# Patient Record
Sex: MALE | Race: WHITE | NOT HISPANIC OR LATINO | Employment: UNEMPLOYED | URBAN - METROPOLITAN AREA
[De-identification: names, ages, dates, MRNs, and addresses within clinical notes are randomized per-mention and may not be internally consistent; named-entity substitution may affect disease eponyms.]

---

## 2019-03-21 ENCOUNTER — OFFICE VISIT (OUTPATIENT)
Dept: GASTROENTEROLOGY | Facility: CLINIC | Age: 34
End: 2019-03-21
Payer: COMMERCIAL

## 2019-03-21 VITALS
BODY MASS INDEX: 42.95 KG/M2 | SYSTOLIC BLOOD PRESSURE: 138 MMHG | DIASTOLIC BLOOD PRESSURE: 98 MMHG | WEIGHT: 300 LBS | HEIGHT: 70 IN | HEART RATE: 106 BPM

## 2019-03-21 DIAGNOSIS — R10.13 EPIGASTRIC ABDOMINAL PAIN: ICD-10-CM

## 2019-03-21 DIAGNOSIS — E66.01 MORBID OBESITY WITH BMI OF 40.0-44.9, ADULT (HCC): ICD-10-CM

## 2019-03-21 DIAGNOSIS — K21.9 GASTROESOPHAGEAL REFLUX DISEASE, ESOPHAGITIS PRESENCE NOT SPECIFIED: ICD-10-CM

## 2019-03-21 DIAGNOSIS — Z12.11 COLON CANCER SCREENING: ICD-10-CM

## 2019-03-21 DIAGNOSIS — G47.33 OBSTRUCTIVE SLEEP APNEA: ICD-10-CM

## 2019-03-21 DIAGNOSIS — K76.0 FATTY LIVER: Primary | ICD-10-CM

## 2019-03-21 PROBLEM — A04.9 BACTERIAL COLITIS: Status: ACTIVE | Noted: 2019-03-21

## 2019-03-21 PROCEDURE — 99214 OFFICE O/P EST MOD 30 MIN: CPT | Performed by: INTERNAL MEDICINE

## 2019-03-21 RX ORDER — ATORVASTATIN CALCIUM 80 MG/1
TABLET, FILM COATED ORAL
COMMUNITY
Start: 2019-02-25

## 2019-03-21 RX ORDER — ESZOPICLONE 2 MG/1
TABLET, FILM COATED ORAL
COMMUNITY
Start: 2019-03-07

## 2019-03-21 RX ORDER — OMEPRAZOLE 40 MG/1
CAPSULE, DELAYED RELEASE ORAL
COMMUNITY
Start: 2019-03-20 | End: 2019-03-21 | Stop reason: SDUPTHER

## 2019-03-21 RX ORDER — BENZTROPINE MESYLATE 1 MG/1
TABLET ORAL
COMMUNITY
Start: 2019-01-11

## 2019-03-21 RX ORDER — ALPRAZOLAM 1 MG/1
TABLET ORAL
COMMUNITY
Start: 2019-03-07

## 2019-03-21 RX ORDER — OMEPRAZOLE 40 MG/1
40 CAPSULE, DELAYED RELEASE ORAL DAILY
Qty: 90 CAPSULE | Refills: 2 | Status: SHIPPED | OUTPATIENT
Start: 2019-03-21 | End: 2019-04-17 | Stop reason: SDUPTHER

## 2019-03-21 RX ORDER — TRIAMCINOLONE ACETONIDE 5 MG/G
CREAM TOPICAL
COMMUNITY
Start: 2019-01-08

## 2019-03-21 RX ORDER — ACETAMINOPHEN 325 MG/1
TABLET ORAL
COMMUNITY
Start: 2019-01-08

## 2019-03-21 RX ORDER — ARIPIPRAZOLE 300 MG
KIT INTRAMUSCULAR
COMMUNITY
Start: 2019-03-12

## 2019-03-21 RX ORDER — BUSPIRONE HYDROCHLORIDE 30 MG/1
TABLET ORAL
COMMUNITY
Start: 2019-02-06

## 2019-03-21 RX ORDER — TRAZODONE HYDROCHLORIDE 100 MG/1
TABLET ORAL
COMMUNITY
Start: 2019-03-07

## 2019-03-21 RX ORDER — VENLAFAXINE HYDROCHLORIDE 75 MG/1
CAPSULE, EXTENDED RELEASE ORAL
COMMUNITY
Start: 2019-03-07

## 2019-03-21 RX ORDER — DEXTROAMPHETAMINE SACCHARATE, AMPHETAMINE ASPARTATE, DEXTROAMPHETAMINE SULFATE AND AMPHETAMINE SULFATE 7.5; 7.5; 7.5; 7.5 MG/1; MG/1; MG/1; MG/1
TABLET ORAL
COMMUNITY
Start: 2019-03-20

## 2019-03-21 RX ORDER — VENLAFAXINE HYDROCHLORIDE 150 MG/1
CAPSULE, EXTENDED RELEASE ORAL
COMMUNITY
Start: 2019-03-20

## 2019-03-21 NOTE — PATIENT INSTRUCTIONS
Gastroesophageal Reflux Disease   WHAT YOU NEED TO KNOW:   Gastroesophageal reflux occurs when acid and food in the stomach back up into the esophagus  Gastroesophageal reflux disease (GERD) is reflux that occurs more than twice a week for a few weeks  It usually causes heartburn and other symptoms  GERD can cause other health problems over time if it is not treated  DISCHARGE INSTRUCTIONS:   Return to the emergency department if:   · You feel full and cannot burp or vomit  · You have severe chest pain and sudden trouble breathing  · Your bowel movements are black, bloody, or tarry-looking  · Your vomit looks like coffee grounds or has blood in it  Contact your healthcare provider if:   · You vomit large amounts, or you vomit often  · You have trouble breathing after you vomit  · You have trouble swallowing, or pain with swallowing  · You are losing weight without trying  · Your symptoms get worse or do not improve with treatment  · You have questions or concerns about your condition or care  Medicines:   · Medicines  are used to decrease stomach acid  Medicine may also be used to help your lower esophageal sphincter and stomach contract (tighten) more  · Take your medicine as directed  Contact your healthcare provider if you think your medicine is not helping or if you have side effects  Tell him of her if you are allergic to any medicine  Keep a list of the medicines, vitamins, and herbs you take  Include the amounts, and when and why you take them  Bring the list or the pill bottles to follow-up visits  Carry your medicine list with you in case of an emergency  Manage GERD:   · Do not have foods or drinks that may increase heartburn  These include chocolate, peppermint, fried or fatty foods, drinks that contain caffeine, or carbonated drinks (soda)  Other foods include spicy foods, onions, tomatoes, and tomato-based foods   Do not have foods or drinks that can irritate your esophagus, such as citrus fruits, juices, and alcohol  · Do not eat large meals  When you eat a lot of food at one time, your stomach needs more acid to digest it  Eat 6 small meals each day instead of 3 large ones, and eat slowly  Do not eat meals 2 to 3 hours before bedtime  · Elevate the head of your bed  Place 6-inch blocks under the head of your bed frame  You may also use more than one pillow under your head and shoulders while you sleep  · Maintain a healthy weight  If you are overweight, weight loss may help relieve symptoms of GERD  · Do not smoke  Smoking weakens the lower esophageal sphincter and increases the risk of GERD  Ask your healthcare provider for information if you currently smoke and need help to quit  E-cigarettes or smokeless tobacco still contain nicotine  Talk to your healthcare provider before you use these products  · Do not wear clothing that is tight around your waist   Tight clothing can put pressure on your stomach and cause or worsen GERD symptoms  Follow up with your healthcare provider as directed:  Write down your questions so you remember to ask them during your visits  © 2017 [facility] Information is for End User's use only and may not be sold, redistributed or otherwise used for commercial purposes  All illustrations and images included in CareNotes® are the copyrighted property of [employer]  or [employer]  The above information is an  only  It is not intended as medical advice for individual conditions or treatments  Talk to your doctor, nurse or pharmacist before following any medical regimen to see if it is safe and effective for you

## 2019-03-21 NOTE — PROGRESS NOTES
[facility] - Outpatient Follow-up Note  Reece Michael 26 y o  male MRN: [id number]  Encounter: [id number]    ASSESSMENT AND PLAN:      1  Fatty liver  Fatty liver noted on ultrasound  Weight loss recommended  LFTs normal in December 2018  Consider checking HOFF fibrosis score at next blood work  2  Gastroesophageal reflux disease, esophagitis presence not specified  Obesity with reflux worsened by CPAP  Started omeprazole with resolution of symptoms  Patient continues to decline any endoscopic evaluation as he does not wish to be sedated  - EGD when patient willing to rule out Saldivar's  - GERD lifestyle modifications, weight loss  - omeprazole (PriLOSEC) 40 MG capsule; Take 1 capsule (40 mg total) by mouth daily  Dispense: 90 capsule; Refill: 2    3  Epigastric abdominal pain  GERD aggravated by CPAP improved with omeprazole    4  Obstructive sleep apnea  On CPAP    5  Morbid obesity with BMI of 40 0-44 9, adult (HCC)  Weight loss recommended    6  Colon cancer screening  Average risk, can started age 46      Followup Appointment[de-identified]  1 year  ______________________________________________________________________    Chief Complaint   Patient presents with    Follow-up     epigastric pain     HPI:  This is a 33-year-old male here today for follow-up  He was last seen in the office on January 23, 2019 for epigastric abdominal pain  He has a medical history significant for multiple psych issues, obstructive sleep apnea on CPAP, smoker, who was having some discomfort  This epigastric discomfort has been going on for a long time but has gotten worse since starting CPAP  Often with a lot of heartburn and radiating pain to the right upper quadrant, worse with eating  Her an ultrasound was performed on each showing fatty liver, no biliary disease  We had discussed getting an EGD but patient does not wish to be sedated    Restart omeprazole with complete resolution of his symptoms  Still smoking  Trying to lose weight  Recently, he states that he has been having some breathing issues  His primary doctor started him on some medications, although I do not see any respiratory medications on his list   He complains of dyspnea on exertion  Historical Information   Past Medical History:   Diagnosis Date    ADHD     Anxiety     Bilateral knee pain     Fatty liver     Noted on ultrasound, 2/19/19, normal LFTs 2/19/19    Hyperlipidemia     Morbid obesity with BMI of 40 0-44 9, adult (HCC)     Nicotine dependence     ALEJANDRA on CPAP     Psoriasis     Schizoaffective disorder ([address] )     Managed by psychiatry, Dr Rut Hicks at [facility]     History reviewed  No pertinent surgical history  Social History     Substance and Sexual Activity   Alcohol Use Not Currently    Frequency: Never     Social History     Substance and Sexual Activity   Drug Use Never     Social History     Tobacco Use   Smoking Status Current Every Day Smoker   Smokeless Tobacco Never Used     Family History   Problem Relation Age of Onset    Colon cancer Neg Hx     Colon polyps Neg Hx     Inflammatory bowel disease Neg Hx          Current Outpatient Medications:     ABILIFY MAINTENA 300 MG injection    ALPRAZolam (XANAX) 1 mg tablet    amphetamine-dextroamphetamine (ADDERALL) 30 MG tablet    atorvastatin (LIPITOR) 80 mg tablet    benztropine (COGENTIN) 1 mg tablet    busPIRone (BUSPAR) 30 MG tablet    eszopiclone (LUNESTA) 2 mg tablet    omeprazole (PriLOSEC) 40 MG capsule    traZODone (DESYREL) 100 mg tablet    triamcinolone (KENALOG) 0 5 % cream    venlafaxine (EFFEXOR-XR) 150 mg 24 hr capsule    venlafaxine (EFFEXOR-XR) 75 mg 24 hr capsule    NON-ASPIRIN PAIN RELIEF 325 MG tablet  No Known Allergies    Shortness of breath, wheezing  Joint pains  10 Point REVIEW OF SYSTEMS IS OTHERWISE NEGATIVE      PHYSICAL EXAM:    Blood pressure 138/98, pulse (!) 106, height 5' 10" (1 778 m), weight 136 kg (300 lb)  Body mass index is 43 05 kg/m²  General Appearance:  Alert, cooperative, no distress  Facial tics  HEENT:  Normocephalic, atraumatic, anicteric  Neck: Supple, symmetrical, trachea midline  Lungs: Clear to auscultation bilaterally; no rales, rhonchi; respirations unlabored  Expiratory wheezing, mild in the bases  Heart: Regular rate and rhythm; no murmur, rub, or gallop  Abdomen:   Soft, non-tender, non-distended; normal bowel sounds; no masses, no organomegaly  Exam limited by body habitus    Rectal:  Deferred   Extremities:  No cyanosis, clubbing or edema   Skin:  No jaundice, rashes, or lesions   Lymph nodes: No palpable cervical lymphadenopathy     Lab Results:   No results found for: WBC, HGB, HCT, MCV, PLT  No results found for: NA, K, CL, CO2, ANIONGAP, BUN, CREATININE, GLUCOSE, GLUF, CALCIUM, CORRECTEDCA, AST, ALT, ALKPHOS, PROT, BILITOT, EGFR  No results found for: IRON, TIBC, FERRITIN  No results found for: LIPASE    Radiology Results:   No results found

## 2019-03-21 NOTE — LETTER
March 21, 2019     Ronna Scheuermann, MD [place]  [place] [address]    Patient: Garcia Purcell   YOB: 1985   Date of Visit: 3/21/2019       Dear Dr Danica Diaz: Thank you for referring Garcia Purcell to me for evaluation  Below are my notes for this consultation  If you have questions, please do not hesitate to call me  I look forward to following your patient along with you  Sincerely,        Jeffery Patel MD        CC: No Recipients  Jeffery Patel MD  3/21/2019 12:00 PM  Sign at close encounter  [address] [facility] - Outpatient Follow-up Note  Garcia Purcell 26 y o  male MRN: [id number]  Encounter: [id number]    ASSESSMENT AND PLAN:      1  Fatty liver  Fatty liver noted on ultrasound  Weight loss recommended  LFTs normal in December 2018  Consider checking HOFF fibrosis score at next blood work  2  Gastroesophageal reflux disease, esophagitis presence not specified  Obesity with reflux worsened by CPAP  Started omeprazole with resolution of symptoms  Patient continues to decline any endoscopic evaluation as he does not wish to be sedated  - EGD when patient willing to rule out Saldivar's  - GERD lifestyle modifications, weight loss  - omeprazole (PriLOSEC) 40 MG capsule; Take 1 capsule (40 mg total) by mouth daily  Dispense: 90 capsule; Refill: 2    3  Epigastric abdominal pain  GERD aggravated by CPAP improved with omeprazole    4  Obstructive sleep apnea  On CPAP    5  Morbid obesity with BMI of 40 0-44 9, adult (HCC)  Weight loss recommended    6  Colon cancer screening  Average risk, can started age 46      Followup Appointment[de-identified]  1 year  ______________________________________________________________________    Chief Complaint   Patient presents with    Follow-up     epigastric pain     HPI:  This is a 47-year-old male here today for follow-up  He was last seen in the office on January 23, 2019 for epigastric abdominal pain  He has a medical history significant for multiple psych issues, obstructive sleep apnea on CPAP, smoker, who was having some discomfort  This epigastric discomfort has been going on for a long time but has gotten worse since starting CPAP  Often with a lot of heartburn and radiating pain to the right upper quadrant, worse with eating  Her an ultrasound was performed on each showing fatty liver, no biliary disease  We had discussed getting an EGD but patient does not wish to be sedated  Restart omeprazole with complete resolution of his symptoms  Still smoking  Trying to lose weight  Recently, he states that he has been having some breathing issues  His primary doctor started him on some medications, although I do not see any respiratory medications on his list   He complains of dyspnea on exertion  Historical Information   Past Medical History:   Diagnosis Date    ADHD     Anxiety     Bilateral knee pain     Fatty liver     Noted on ultrasound, 2/19/19, normal LFTs 2/19/19    Hyperlipidemia     Morbid obesity with BMI of 40 0-44 9, adult (HCC)     Nicotine dependence     ALEJANDRA on CPAP     Psoriasis     Schizoaffective disorder ([address] )     Managed by psychiatry, Dr Coleen Redd at [facility]     History reviewed  No pertinent surgical history    Social History     Substance and Sexual Activity   Alcohol Use Not Currently    Frequency: Never     Social History     Substance and Sexual Activity   Drug Use Never     Social History     Tobacco Use   Smoking Status Current Every Day Smoker   Smokeless Tobacco Never Used     Family History   Problem Relation Age of Onset    Colon cancer Neg Hx     Colon polyps Neg Hx     Inflammatory bowel disease Neg Hx          Current Outpatient Medications:     ABILIFY MAINTENA 300 MG injection    ALPRAZolam (XANAX) 1 mg tablet    amphetamine-dextroamphetamine (ADDERALL) 30 MG tablet    atorvastatin (LIPITOR) 80 mg tablet    benztropine (COGENTIN) 1 mg tablet    busPIRone (BUSPAR) 30 MG tablet    eszopiclone (LUNESTA) 2 mg tablet    omeprazole (PriLOSEC) 40 MG capsule    traZODone (DESYREL) 100 mg tablet    triamcinolone (KENALOG) 0 5 % cream    venlafaxine (EFFEXOR-XR) 150 mg 24 hr capsule    venlafaxine (EFFEXOR-XR) 75 mg 24 hr capsule    NON-ASPIRIN PAIN RELIEF 325 MG tablet  No Known Allergies    Shortness of breath, wheezing  Joint pains  10 Point REVIEW OF SYSTEMS IS OTHERWISE NEGATIVE  PHYSICAL EXAM:    Blood pressure 138/98, pulse (!) 106, height 5' 10" (1 778 m), weight 136 kg (300 lb)  Body mass index is 43 05 kg/m²  General Appearance:  Alert, cooperative, no distress  Facial tics  HEENT:  Normocephalic, atraumatic, anicteric  Neck: Supple, symmetrical, trachea midline  Lungs: Clear to auscultation bilaterally; no rales, rhonchi; respirations unlabored  Expiratory wheezing, mild in the bases  Heart: Regular rate and rhythm; no murmur, rub, or gallop  Abdomen:   Soft, non-tender, non-distended; normal bowel sounds; no masses, no organomegaly  Exam limited by body habitus    Rectal:  Deferred   Extremities:  No cyanosis, clubbing or edema   Skin:  No jaundice, rashes, or lesions   Lymph nodes: No palpable cervical lymphadenopathy     Lab Results:   No results found for: WBC, HGB, HCT, MCV, PLT  No results found for: NA, K, CL, CO2, ANIONGAP, BUN, CREATININE, GLUCOSE, GLUF, CALCIUM, CORRECTEDCA, AST, ALT, ALKPHOS, PROT, BILITOT, EGFR  No results found for: IRON, TIBC, FERRITIN  No results found for: LIPASE    Radiology Results:   No results found

## 2019-04-17 DIAGNOSIS — K21.9 GASTROESOPHAGEAL REFLUX DISEASE, ESOPHAGITIS PRESENCE NOT SPECIFIED: ICD-10-CM

## 2019-04-22 RX ORDER — OMEPRAZOLE 40 MG/1
CAPSULE, DELAYED RELEASE ORAL
Qty: 30 CAPSULE | Refills: 0 | Status: SHIPPED | OUTPATIENT
Start: 2019-04-22 | End: 2019-12-21 | Stop reason: SDUPTHER

## 2019-12-21 DIAGNOSIS — K21.9 GASTROESOPHAGEAL REFLUX DISEASE, ESOPHAGITIS PRESENCE NOT SPECIFIED: ICD-10-CM

## 2019-12-27 RX ORDER — OMEPRAZOLE 40 MG/1
CAPSULE, DELAYED RELEASE ORAL
Qty: 30 CAPSULE | Refills: 0 | Status: SHIPPED | OUTPATIENT
Start: 2019-12-27 | End: 2020-01-21

## 2020-01-19 DIAGNOSIS — K21.9 GASTROESOPHAGEAL REFLUX DISEASE, ESOPHAGITIS PRESENCE NOT SPECIFIED: ICD-10-CM

## 2020-01-21 RX ORDER — OMEPRAZOLE 40 MG/1
CAPSULE, DELAYED RELEASE ORAL
Qty: 30 CAPSULE | Refills: 0 | Status: SHIPPED | OUTPATIENT
Start: 2020-01-21 | End: 2020-02-13

## 2020-02-13 DIAGNOSIS — K21.9 GASTROESOPHAGEAL REFLUX DISEASE, ESOPHAGITIS PRESENCE NOT SPECIFIED: ICD-10-CM

## 2020-02-13 RX ORDER — OMEPRAZOLE 40 MG/1
CAPSULE, DELAYED RELEASE ORAL
Qty: 30 CAPSULE | Refills: 0 | Status: SHIPPED | OUTPATIENT
Start: 2020-02-13 | End: 2020-03-11 | Stop reason: SDUPTHER

## 2020-03-11 ENCOUNTER — OFFICE VISIT (OUTPATIENT)
Dept: GASTROENTEROLOGY | Facility: CLINIC | Age: 35
End: 2020-03-11
Payer: COMMERCIAL

## 2020-03-11 VITALS
SYSTOLIC BLOOD PRESSURE: 138 MMHG | BODY MASS INDEX: 43.67 KG/M2 | WEIGHT: 305 LBS | HEIGHT: 70 IN | DIASTOLIC BLOOD PRESSURE: 80 MMHG | HEART RATE: 82 BPM

## 2020-03-11 DIAGNOSIS — K21.9 GASTROESOPHAGEAL REFLUX DISEASE, ESOPHAGITIS PRESENCE NOT SPECIFIED: Primary | ICD-10-CM

## 2020-03-11 DIAGNOSIS — G47.33 OBSTRUCTIVE SLEEP APNEA: ICD-10-CM

## 2020-03-11 DIAGNOSIS — K76.0 FATTY LIVER: ICD-10-CM

## 2020-03-11 DIAGNOSIS — E66.01 MORBID OBESITY WITH BMI OF 40.0-44.9, ADULT (HCC): ICD-10-CM

## 2020-03-11 DIAGNOSIS — Z12.11 COLON CANCER SCREENING: ICD-10-CM

## 2020-03-11 PROCEDURE — 99214 OFFICE O/P EST MOD 30 MIN: CPT | Performed by: INTERNAL MEDICINE

## 2020-03-11 RX ORDER — OMEPRAZOLE 40 MG/1
40 CAPSULE, DELAYED RELEASE ORAL DAILY
Qty: 90 CAPSULE | Refills: 3 | Status: SHIPPED | OUTPATIENT
Start: 2020-03-11 | End: 2021-01-28 | Stop reason: SDUPTHER

## 2020-03-11 NOTE — LETTER
March 11, 2020     Rian Meneses, [address]  [place] [address] [facility]    Patient: Sandi Aldrich   YOB: 1985   Date of Visit: 3/11/2020       Dear Dr Bryan Can: Thank you for referring Sandi Aldrich to me for evaluation  Below are my notes for this consultation  If you have questions, please do not hesitate to call me  I look forward to following your patient along with you  Sincerely,        Yefri Munoz MD        CC: No Recipients  Yefri Munoz MD  3/11/2020 10:06 AM  Incomplete  [address] [facility] - Outpatient Follow-up Note  Sandi Aldrich 37 y o  male MRN: [id number]  Encounter: [id number]    ASSESSMENT AND PLAN:      1  Gastroesophageal reflux disease, esophagitis presence not specified  61-year-old male with obesity, sleep apnea on CPAP, here today for follow-up of reflux  Doing well on the omeprazole 40 mg daily  Never had Saldivar's surveillance so we will schedule today as patient is willing     - Schedule EGD @ [facility]  - omeprazole (PriLOSEC) 40 MG capsule; Take 1 capsule (40 mg total) by mouth daily  Dispense: 90 capsule; Refill: 3    2  Fatty liver  Noted on ultrasound  Normal LFTs in 2018  Will recheck with fibrosis score today  - HCV FIBROSURE; Future  - Hepatic function panel; Future  - Hepatitis C antibody; Future  - HCV FIBROSURE  - Hepatic function panel  - Hepatitis C antibody    3  Obstructive sleep apnea  On CPAP    4  Morbid obesity with BMI of 40 0-44 9, adult ([address] )  Started to see a nutritionist   He is here today with his  who states that they are starting an exercise regimen with hopes to lose weight      5  Colon cancer screening  Average risk, can start at age 46      Followup Appointment:  1 year  ______________________________________________________________________    Chief Complaint   Patient presents with    Follow-up    Heartburn    fatty liver     HPI:  61-year-old male here today for follow-up of his reflux and fatty liver  Doing well on omeprazole 40  No complaints  Gaining weight  Started to see a nutritionist and started an exercise regimen  Still eating large portions  Denies any nausea vomiting, abdominal pain, jaundice, diarrhea, changes in bowel habits  Historical Information   Past Medical History:   Diagnosis Date    ADHD     Anxiety     Bacterial colitis 01/2019    in ER per GI notes    Bilateral knee pain     Depression     Fatty liver     Noted on ultrasound, 2/19/19, normal LFTs 2/19/19    Hyperlipidemia     Morbid obesity with BMI of 40 0-44 9, adult (HCC)     Nicotine dependence     ALEJANDRA on CPAP     Psoriasis     Schizoaffective disorder ([address] )     Managed by psychiatry, Dr Jose Cruz Sarmiento at [facility]     No past surgical history on file    Social History     Substance and Sexual Activity   Alcohol Use Not Currently    Frequency: Never     Social History     Substance and Sexual Activity   Drug Use Never     Social History     Tobacco Use   Smoking Status Current Every Day Smoker    Types: Cigarettes   Smokeless Tobacco Never Used     Family History   Problem Relation Age of Onset    No Known Problems Mother     No Known Problems Father     No Known Problems Brother     No Known Problems Brother     Colon cancer Neg Hx     Colon polyps Neg Hx     Inflammatory bowel disease Neg Hx          Current Outpatient Medications:     ABILIFY MAINTENA 300 MG injection    ALPRAZolam (XANAX) 1 mg tablet    amphetamine-dextroamphetamine (ADDERALL) 30 MG tablet    atorvastatin (LIPITOR) 80 mg tablet    benztropine (COGENTIN) 1 mg tablet    busPIRone (BUSPAR) 30 MG tablet    eszopiclone (LUNESTA) 2 mg tablet    NON-ASPIRIN PAIN RELIEF 325 MG tablet    omeprazole (PriLOSEC) 40 MG capsule    traZODone (DESYREL) 100 mg tablet    triamcinolone (KENALOG) 0 5 % cream    venlafaxine (EFFEXOR-XR) 150 mg 24 hr capsule    venlafaxine (EFFEXOR-XR) 75 mg 24 hr capsule  No Known Allergies  Reviewed medications and allergies and updated as indicated    PHYSICAL EXAM:    Blood pressure 138/80, pulse 82, height 5' 10" (1 778 m), weight (!) 138 kg (305 lb)  Body mass index is 43 76 kg/m²  General Appearance: NAD, cooperative, alert  Eyes: Anicteric, PERRLA, EOMI  ENT:  Normocephalic, atraumatic, normal mucosa  Neck:  Supple, symmetrical, trachea midline  Resp:  Clear to auscultation bilaterally; no rales, rhonchi or wheezing; respirations unlabored   CV:  S1 S2, Regular rate and rhythm; no murmur, rub, or gallop  GI:  Soft, non-tender, non-distended; normal bowel sounds; no masses, no organomegaly   although exam limited by body habitus  Rectal: Deferred  Musculoskeletal: No cyanosis, clubbing or edema  Normal ROM  Skin:  No jaundice, rashes, or lesions   Heme/Lymph: No palpable cervical lymphadenopathy  Psych: Normal affect, good eye contact  Neuro: No gross deficits, AAOx3    Lab Results:   No results found for: WBC, HGB, HCT, MCV, PLT  No results found for: NA, K, CL, CO2, ANIONGAP, BUN, CREATININE, GLUCOSE, GLUF, CALCIUM, CORRECTEDCA, AST, ALT, ALKPHOS, PROT, BILITOT, EGFR  No results found for: IRON, TIBC, FERRITIN  No results found for: LIPASE    Radiology Results:   No results found

## 2020-03-11 NOTE — PROGRESS NOTES
[address] [facility] - Outpatient Follow-up Note  Judd Pruett 37 y o  male MRN: [id number]  Encounter: [id number]    ASSESSMENT AND PLAN:      1  Gastroesophageal reflux disease, esophagitis presence not specified  25-year-old male with obesity, sleep apnea on CPAP, here today for follow-up of reflux  Doing well on the omeprazole 40 mg daily  Never had Saldivar's surveillance so we will schedule today as patient is willing     - Schedule EGD @ [facility]  - omeprazole (PriLOSEC) 40 MG capsule; Take 1 capsule (40 mg total) by mouth daily  Dispense: 90 capsule; Refill: 3    2  Fatty liver  Noted on ultrasound  Normal LFTs in 2018  Will recheck with fibrosis score today  - HCV FIBROSURE; Future  - Hepatic function panel; Future  - Hepatitis C antibody; Future  - HCV FIBROSURE  - Hepatic function panel  - Hepatitis C antibody    3  Obstructive sleep apnea  On CPAP    4  Morbid obesity with BMI of 40 0-44 9, adult ([address] )  Started to see a nutritionist   He is here today with his  who states that they are starting an exercise regimen with hopes to lose weight  5  Colon cancer screening  Average risk, can start at age 46      Followup Appointment:  1 year  ______________________________________________________________________    Chief Complaint   Patient presents with    Follow-up    Heartburn    fatty liver     HPI:  25-year-old male here today for follow-up of his reflux and fatty liver  Doing well on omeprazole 40  No complaints  Gaining weight  Started to see a nutritionist and started an exercise regimen  Still eating large portions  Denies any nausea vomiting, abdominal pain, jaundice, diarrhea, changes in bowel habits      Historical Information   Past Medical History:   Diagnosis Date    ADHD     Anxiety     Bacterial colitis 01/2019    in ER per GI notes    Bilateral knee pain     Depression     Fatty liver     Noted on ultrasound, 2/19/19, normal LFTs 2/19/19    Hyperlipidemia     Morbid obesity with BMI of 40 0-44 9, adult (HCC)     Nicotine dependence     ALEJANDRA on CPAP     Psoriasis     Schizoaffective disorder ([address] )     Managed by psychiatry, Dr Stephany Rodriguez at [facility]     No past surgical history on file  Social History     Substance and Sexual Activity   Alcohol Use Not Currently    Frequency: Never     Social History     Substance and Sexual Activity   Drug Use Never     Social History     Tobacco Use   Smoking Status Current Every Day Smoker    Types: Cigarettes   Smokeless Tobacco Never Used     Family History   Problem Relation Age of Onset    No Known Problems Mother     No Known Problems Father     No Known Problems Brother     No Known Problems Brother     Colon cancer Neg Hx     Colon polyps Neg Hx     Inflammatory bowel disease Neg Hx          Current Outpatient Medications:     ABILIFY MAINTENA 300 MG injection    ALPRAZolam (XANAX) 1 mg tablet    amphetamine-dextroamphetamine (ADDERALL) 30 MG tablet    atorvastatin (LIPITOR) 80 mg tablet    benztropine (COGENTIN) 1 mg tablet    busPIRone (BUSPAR) 30 MG tablet    eszopiclone (LUNESTA) 2 mg tablet    NON-ASPIRIN PAIN RELIEF 325 MG tablet    omeprazole (PriLOSEC) 40 MG capsule    traZODone (DESYREL) 100 mg tablet    triamcinolone (KENALOG) 0 5 % cream    venlafaxine (EFFEXOR-XR) 150 mg 24 hr capsule    venlafaxine (EFFEXOR-XR) 75 mg 24 hr capsule  No Known Allergies  Reviewed medications and allergies and updated as indicated    PHYSICAL EXAM:    Blood pressure 138/80, pulse 82, height 5' 10" (1 778 m), weight (!) 138 kg (305 lb)  Body mass index is 43 76 kg/m²  General Appearance: NAD, cooperative, alert  Eyes: Anicteric, PERRLA, EOMI  ENT:  Normocephalic, atraumatic, normal mucosa      Neck:  Supple, symmetrical, trachea midline  Resp:  Clear to auscultation bilaterally; no rales, rhonchi or wheezing; respirations unlabored   CV:  S1 S2, Regular rate and rhythm; no murmur, rub, or gallop  GI:  Soft, non-tender, non-distended; normal bowel sounds; no masses, no organomegaly  although exam limited by body habitus  Rectal: Deferred  Musculoskeletal: No cyanosis, clubbing or edema  Normal ROM  Skin:  No jaundice, rashes, or lesions   Heme/Lymph: No palpable cervical lymphadenopathy  Psych: Normal affect, good eye contact  Neuro: No gross deficits, AAOx3    Lab Results:   No results found for: WBC, HGB, HCT, MCV, PLT  No results found for: NA, K, CL, CO2, ANIONGAP, BUN, CREATININE, GLUCOSE, GLUF, CALCIUM, CORRECTEDCA, AST, ALT, ALKPHOS, PROT, BILITOT, EGFR  No results found for: IRON, TIBC, FERRITIN  No results found for: LIPASE    Radiology Results:   No results found

## 2020-03-13 LAB
A2 MACROGLOB SERPL-MCNC: 116 MG/DL (ref 110–276)
ALBUMIN SERPL-MCNC: 4.6 G/DL (ref 4–5)
ALP SERPL-CCNC: 62 IU/L (ref 39–117)
ALT SERPL W P-5'-P-CCNC: 35 IU/L (ref 0–55)
ALT SERPL-CCNC: 30 IU/L (ref 0–44)
APO A-I SERPL-MCNC: 82 MG/DL (ref 101–178)
AST SERPL-CCNC: 28 IU/L (ref 0–40)
BILIRUB DIRECT SERPL-MCNC: 0.08 MG/DL (ref 0–0.4)
BILIRUB SERPL-MCNC: 0.1 MG/DL (ref 0–1.2)
BILIRUB SERPL-MCNC: <0.2 MG/DL (ref 0–1.2)
COMMENT: ABNORMAL
FIBROSIS SCORING:: ABNORMAL
FIBROSIS STAGE SERPL QL: ABNORMAL
GGT SERPL-CCNC: 58 IU/L (ref 0–65)
HAPTOGLOB SERPL-MCNC: 129 MG/DL (ref 17–317)
HCV AB S/CO SERPL IA: <0.1 S/CO RATIO (ref 0–0.9)
INTERPRETATIONS: ABNORMAL
LIVER FIBR SCORE SERPL CALC.FIBROSURE: 0.06 (ref 0–0.21)
NECROINFLAMM ACTIVITY SCORING:: ABNORMAL
NECROINFLAMMATORY ACT GRADE SERPL QL: ABNORMAL
NECROINFLAMMATORY ACT SCORE SERPL: 0.13 (ref 0–0.17)
PROT SERPL-MCNC: 7.2 G/DL (ref 6–8.5)
SERVICE CMNT-IMP: ABNORMAL

## 2020-05-08 RX ORDER — SODIUM CHLORIDE 9 MG/ML
75 INJECTION, SOLUTION INTRAVENOUS CONTINUOUS
Status: CANCELLED | OUTPATIENT
Start: 2020-05-08

## 2020-05-18 ENCOUNTER — TELEPHONE (OUTPATIENT)
Dept: GASTROENTEROLOGY | Facility: CLINIC | Age: 35
End: 2020-05-18

## 2020-05-20 ENCOUNTER — HOSPITAL ENCOUNTER (OUTPATIENT)
Dept: GASTROENTEROLOGY | Facility: HOSPITAL | Age: 35
Setting detail: OUTPATIENT SURGERY
Discharge: HOME | End: 2020-05-20
Attending: INTERNAL MEDICINE

## 2020-10-15 ENCOUNTER — TELEPHONE (OUTPATIENT)
Dept: GASTROENTEROLOGY | Facility: CLINIC | Age: 35
End: 2020-10-15

## 2021-01-25 DIAGNOSIS — K21.9 GASTROESOPHAGEAL REFLUX DISEASE: ICD-10-CM

## 2021-01-25 RX ORDER — OMEPRAZOLE 40 MG/1
40 CAPSULE, DELAYED RELEASE ORAL DAILY
Qty: 28 CAPSULE | Refills: 1 | Status: CANCELLED | OUTPATIENT
Start: 2021-01-25 | End: 2021-02-22

## 2021-01-28 DIAGNOSIS — K21.9 GASTROESOPHAGEAL REFLUX DISEASE: ICD-10-CM

## 2021-01-28 RX ORDER — OMEPRAZOLE 40 MG/1
40 CAPSULE, DELAYED RELEASE ORAL DAILY
Qty: 90 CAPSULE | Refills: 1 | Status: SHIPPED | OUTPATIENT
Start: 2021-01-28

## 2021-02-03 NOTE — TELEPHONE ENCOUNTER
1st call-lvm for pt to resched cancelled egd per dr shin-if pt does not want to sched the egd sched ov

## 2021-02-24 NOTE — TELEPHONE ENCOUNTER
Per dr flores if pt did not reschedule cancelled egd which he did not-she wants pt to be contacted to schedule ov

## 2023-04-26 ENCOUNTER — TELEPHONE (OUTPATIENT)
Dept: OTHER | Facility: OTHER | Age: 38
End: 2023-04-26

## 2023-04-26 NOTE — TELEPHONE ENCOUNTER
Called pt's mom Dominick Travis back, pt was seen by Darion 4/24 and was recommended he have an apt here due to weight loss, reflux and lung infiltrate  Scheduled an apt 5/3  [facility] records obtained and scanned to [address]  Called Darion and spoke to Zulema, she will fax his most recent labs and ov from March  His 4/24 ov note is not yet dictated  Note written for ov note as a reminder

## 2023-04-26 NOTE — TELEPHONE ENCOUNTER
Pt's mother called in stating pt is with [facility] and the doctor said he would call Dr Sarita Liu to see about getting pt in for an endoscopy  She would like to know if this was done  She is requesting a call back at [phone]

## 2023-05-03 ENCOUNTER — TELEPHONE (OUTPATIENT)
Dept: GASTROENTEROLOGY | Facility: CLINIC | Age: 38
End: 2023-05-03

## 2023-05-03 ENCOUNTER — CONSULT (OUTPATIENT)
Dept: GASTROENTEROLOGY | Facility: CLINIC | Age: 38
End: 2023-05-03

## 2023-05-03 VITALS
DIASTOLIC BLOOD PRESSURE: 80 MMHG | WEIGHT: 253 LBS | BODY MASS INDEX: 36.22 KG/M2 | HEIGHT: 70 IN | SYSTOLIC BLOOD PRESSURE: 102 MMHG

## 2023-05-03 DIAGNOSIS — R63.4 WEIGHT LOSS, ABNORMAL: ICD-10-CM

## 2023-05-03 DIAGNOSIS — K21.9 GASTROESOPHAGEAL REFLUX DISEASE, UNSPECIFIED WHETHER ESOPHAGITIS PRESENT: Primary | ICD-10-CM

## 2023-05-03 DIAGNOSIS — R93.89 ABNORMAL CT OF THE CHEST: ICD-10-CM

## 2023-05-03 RX ORDER — POLYETHYLENE GLYCOL 3350, SODIUM SULFATE ANHYDROUS, SODIUM BICARBONATE, SODIUM CHLORIDE, POTASSIUM CHLORIDE 236; 22.74; 6.74; 5.86; 2.97 G/4L; G/4L; G/4L; G/4L; G/4L
4000 POWDER, FOR SOLUTION ORAL ONCE
Qty: 4000 ML | Refills: 0 | Status: SHIPPED | OUTPATIENT
Start: 2023-05-03 | End: 2023-05-03

## 2023-05-03 RX ORDER — NALTREXONE HYDROCHLORIDE 50 MG/1
380 TABLET, FILM COATED ORAL DAILY
COMMUNITY

## 2023-05-03 RX ORDER — ZOLPIDEM TARTRATE 10 MG/1
TABLET ORAL
COMMUNITY
Start: 2023-04-26

## 2023-05-03 RX ORDER — ALBUTEROL SULFATE
POWDER (GRAM) MISCELLANEOUS
COMMUNITY

## 2023-05-03 RX ORDER — ESCITALOPRAM OXALATE 5 MG/1
TABLET ORAL
COMMUNITY
Start: 2023-04-26

## 2023-05-03 RX ORDER — POLYETHYLENE GLYCOL 3350 17 G/17G
17 POWDER, FOR SOLUTION ORAL DAILY
Qty: 238 G | Refills: 0 | Status: SHIPPED | OUTPATIENT
Start: 2023-05-03

## 2023-05-03 RX ORDER — NALOXONE HYDROCHLORIDE 4 MG/.1ML
SPRAY NASAL
COMMUNITY
Start: 2023-03-27

## 2023-05-03 RX ORDER — CLONIDINE HYDROCHLORIDE 0.1 MG/1
TABLET ORAL
COMMUNITY
Start: 2023-04-26

## 2023-05-03 RX ORDER — QUETIAPINE FUMARATE 50 MG/1
TABLET, FILM COATED ORAL
COMMUNITY
Start: 2023-04-26

## 2023-05-03 NOTE — TELEPHONE ENCOUNTER
Procedure rescheduled-requested convenience care    Scheduled date of EGD/colonoscopy (as of today):05/30/2023  Physician performing EGD/colonoscopy: Dr Ally Sosa  Location of EGD/colonoscopy: [facility]    Waiting for approval from Dr Florin Xie for convenience care

## 2023-05-03 NOTE — TELEPHONE ENCOUNTER
Scheduled date of EGD/colonoscopy (as of today): 5/11/23  Physician performing EGD/colonoscopy: Dr Ofelia Vogel  Location of EGD/colonoscopy: [facility] ([facility])  Desired bowel prep reviewed with patient: golytely  Instructions reviewed with patient by: gave patient packet  Clearances:  No

## 2023-05-03 NOTE — TELEPHONE ENCOUNTER
Dr Leandro Landon pt's mom Mamie Rosales back, after reading the prep instructions she is very concerned he will need a great deal of assistance  [facility] offers convenience care for the pt to be admitted late afternoon the evening prior to his procedures for assistance with the prep  OK to set that up for the pt at [facility]  Harini--pt's mom would like to proceed with scheduling at [facility] if ok with Dr Marisabel Mercer  She said you were working on it

## 2023-05-03 NOTE — PROGRESS NOTES
[facility] - Outpatient Consultation  Jaydon Garay 34 y o  male MRN: [id number]  Encounter: [id number]    ASSESSMENT AND PLAN:      1  Gastroesophageal reflux disease, unspecified whether esophagitis present  Symptoms are well controlled on omeprazole 20 mg daily, however the severe weight loss is quite concerning, and I will proceed with upper endoscopy    2  Weight loss, abnormal  To be complete, I also will proceed with colonoscopy, he did have a diagnosis of bacterial colitis a few years ago  While he is at average risk for colon cancer, the weight loss is certainly an indication for the procedure  Procedure risks and preparation discussed in detail with the patient and his parents  3  Abnormal CT of the chest  There are no gross findings to suggest an upper GI tract neoplasm, however the right upper lobe infiltrate could represent aspiration from reflux  We will proceed as above      Follow up Appointment: For EGD and colonoscopy    Chief Complaint   Patient presents with    Weight Loss     Consult for EGD       HPI:   Jaydon Garay is a 34y o  year old male with a PMH significant for GERD and bacterial colitis who presents from a consultation from oncology for extreme weight loss  In the year, he has lost 76 pounds with no clear etiology  He states (and his parents confirmed) that his diet has not changed  He has no change in bowel habits, but he does have chronic GERD well-controlled on omeprazole 20 mg  He denies dysphagia and odynophagia  He is a past smoker  Due to his schizophrenia and mental illness, the review of systems and history was collaborated by the parents in the room      Historical Information   Past Medical History:   Diagnosis Date    ADHD     Anxiety     Bacterial colitis 01/2019    in ER per GI notes    Bilateral knee pain     Depression     Fatty liver     Noted on ultrasound, 2/19/19, normal LFTs 2/19/19    GERD "(gastroesophageal reflux disease)     Hyperlipidemia     Morbid obesity with BMI of 40 0-44 9, adult (HCC)     Nicotine dependence     ALEJANDRA on CPAP     Psoriasis     Schizoaffective disorder ([address] )     Managed by psychiatry, Dr Emerson Nesbitt at [facility]     History reviewed  No pertinent surgical history  Social History     Substance and Sexual Activity   Alcohol Use Not Currently     Social History     Substance and Sexual Activity   Drug Use Never     Social History     Tobacco Use   Smoking Status Some Days    Types: Cigarettes   Smokeless Tobacco Never     Family History   Problem Relation Age of Onset    No Known Problems Mother     No Known Problems Father     No Known Problems Brother     No Known Problems Brother     Colon cancer Neg Hx     Colon polyps Neg Hx     Inflammatory bowel disease Neg Hx        Meds/Allergies     Current Outpatient Medications:     ABILIFY MAINTENA 300 MG injection    atorvastatin (LIPITOR) 80 mg tablet    benztropine (COGENTIN) 1 mg tablet    omeprazole (PriLOSEC) 40 MG capsule    ALPRAZolam (XANAX) 1 mg tablet    amphetamine-dextroamphetamine (ADDERALL) 30 MG tablet    busPIRone (BUSPAR) 30 MG tablet    eszopiclone (LUNESTA) 2 mg tablet    NON-ASPIRIN PAIN RELIEF 325 MG tablet    traZODone (DESYREL) 100 mg tablet    triamcinolone (KENALOG) 0 5 % cream    venlafaxine (EFFEXOR-XR) 150 mg 24 hr capsule    venlafaxine (EFFEXOR-XR) 75 mg 24 hr capsule    No Known Allergies    PHYSICAL EXAM:    Blood pressure 102/80, height 5' 10\" (1 778 m), weight 115 kg (253 lb)  Body mass index is 36 3 kg/m²  General Appearance: NAD, cooperative, alert  Eyes: Anicteric  GI:  Soft, non-tender, non-distended; normal bowel sounds; no masses, no organomegaly   Rectal: Deferred  Musculoskeletal: No edema    Skin:  No jaundice    Lab Results:   No results found for: WBC, HGB, MCV, PLT, INR  Lab Results   Component Value Date    AST 28 03/11/2020    ALT 35 03/11/2020    " ALT 30 03/11/2020     No results found for: IRON, TIBC, FERRITIN  No results found for: LIPASE    Radiology Results:   No results found

## 2023-05-03 NOTE — TELEPHONE ENCOUNTER
Pt's mother called because she doesn't feel he can do the prep independently  She would like to know if he can be admitted to do the prep   She can be reached at [phone]

## 2023-05-29 ENCOUNTER — NURSE TRIAGE (OUTPATIENT)
Dept: OTHER | Facility: OTHER | Age: 38
End: 2023-05-29

## 2023-05-29 NOTE — TELEPHONE ENCOUNTER
"  Reason for Disposition  • Question about upcoming scheduled test, no triage required and triager able to answer question    Answer Assessment - Initial Assessment Questions  1  REASON FOR CALL or QUESTION: \"What is your reason for calling today? \" or \"How can I best help you? \" or \"What question do you have that I can help answer? \"      Can you help my son get admitted to [facility]    Protocols used: INFORMATION ONLY CALL - NO TRIAGE-ADULT-AH    "

## 2023-05-29 NOTE — TELEPHONE ENCOUNTER
"Regarding: procedure prep  ----- Message from Shine Vargas sent at 5/29/2023 12:41 PM EDT -----  \"My son has a procedure schedule for tomorrow and I was suppose to be admitted today for convenience care and I never received a call back \"    "

## 2023-05-30 NOTE — TELEPHONE ENCOUNTER
Reviewed [facility] records, pt was admitted 5/29 and there is a nurse note at 19:54 pt drinking Golytely prep

## 2023-06-05 ENCOUNTER — TELEPHONE (OUTPATIENT)
Dept: GASTROENTEROLOGY | Facility: CLINIC | Age: 38
End: 2023-06-05

## 2023-06-05 DIAGNOSIS — R76.8 POSITIVE SEROLOGY FOR HELICOBACTER PYLORI: ICD-10-CM

## 2023-06-05 DIAGNOSIS — A04.8 HELICOBACTER PYLORI (H. PYLORI) INFECTION: Primary | ICD-10-CM

## 2023-06-05 NOTE — TELEPHONE ENCOUNTER
Patients GI provider:  Dr Flor Neves / [facility] Dunn    Number to return call: [phone] (DAD)    Reason for call: Pt's Dad calling, states he received a call to schedule a FU visit for his son  There is no availability until August   Pt had Colon/EGD showed no reason for weight loss  Please return his call, unsure how soon appointment needs to be or if any further testing is needed       Scheduled procedure/appointment date if applicable: Apt/procedure 5/1/23

## 2023-06-06 ENCOUNTER — TELEPHONE (OUTPATIENT)
Dept: GASTROENTEROLOGY | Facility: CLINIC | Age: 38
End: 2023-06-06

## 2023-06-06 DIAGNOSIS — A04.8 H. PYLORI INFECTION: Primary | ICD-10-CM

## 2023-06-06 RX ORDER — BISMUTH SUBSALICYLATE 262 MG/1
262 TABLET, CHEWABLE ORAL
Qty: 56 TABLET | Refills: 0 | Status: SHIPPED | OUTPATIENT
Start: 2023-06-06

## 2023-06-06 RX ORDER — OMEPRAZOLE 20 MG/1
20 TABLET, DELAYED RELEASE ORAL
Qty: 28 TABLET | Refills: 0 | Status: SHIPPED | OUTPATIENT
Start: 2023-06-06 | End: 2023-06-20

## 2023-06-06 RX ORDER — TETRACYCLINE HYDROCHLORIDE 500 MG/1
500 CAPSULE ORAL 4 TIMES DAILY
Qty: 56 CAPSULE | Refills: 0 | Status: SHIPPED | OUTPATIENT
Start: 2023-06-06 | End: 2023-06-20

## 2023-06-06 RX ORDER — METRONIDAZOLE 250 MG/1
250 TABLET ORAL EVERY 6 HOURS
Qty: 56 TABLET | Refills: 0 | Status: SHIPPED | OUTPATIENT
Start: 2023-06-06 | End: 2023-06-20

## 2023-06-06 NOTE — TELEPHONE ENCOUNTER
Thoroughly reviewed Quadruple  H Pylori Instructions with patient father  [address] [facility] helps to manage medications  I ended up sending Pepto script to [facility] as well as he doesn't use a outpatient pharmacy  They deliver medications daily

## 2023-06-06 NOTE — TELEPHONE ENCOUNTER
H  pylori stool order placed for [facility]  Mailed a hard copy to parents home address [address], [address]      Specific instructions: Please complete stool specimen 4 weeks after H pylori treatment completed  It is important you are off of omeprazole two weeks prior  Once you submit the stool specimen you can resume your omeprazole 40 mg daily

## 2023-06-06 NOTE — TELEPHONE ENCOUNTER
I called the patient's mother and left a voice message  Patient's father wanted to see if the patient can be seen in the office sooner than August   We can put him on the waiting list for myself or Dr Lobato Sic  No reason for weight loss ascertained by EGD or colonoscopy  EGD positive for H  pylori so we will treat with quadruple therapy  Colonoscopy poor prep but no pathology no major lesions  Recall exam for 1 year  Left message for mother that she could call back for further questions  Not sure what diagnostic avenues we would pursue at this time

## 2023-08-15 ENCOUNTER — NURSE TRIAGE (OUTPATIENT)
Age: 38
End: 2023-08-15

## 2023-08-15 NOTE — TELEPHONE ENCOUNTER
Patient father calling in, reports patient did not have h pylori stool study done yet but is schedule for office visit on 8/17. I advised him to submit stool study and still come in for office visit, we can call patient for results after. He understood.

## 2023-08-17 ENCOUNTER — OFFICE VISIT (OUTPATIENT)
Dept: GASTROENTEROLOGY | Facility: CLINIC | Age: 38
End: 2023-08-17
Payer: COMMERCIAL

## 2023-08-17 VITALS
WEIGHT: 256 LBS | SYSTOLIC BLOOD PRESSURE: 106 MMHG | DIASTOLIC BLOOD PRESSURE: 74 MMHG | HEIGHT: 70 IN | BODY MASS INDEX: 36.65 KG/M2

## 2023-08-17 DIAGNOSIS — A04.8 H. PYLORI INFECTION: Primary | ICD-10-CM

## 2023-08-17 PROCEDURE — 99213 OFFICE O/P EST LOW 20 MIN: CPT | Performed by: INTERNAL MEDICINE

## 2023-08-17 RX ORDER — GABAPENTIN 400 MG/1
CAPSULE ORAL
COMMUNITY
Start: 2023-08-16

## 2023-08-17 NOTE — PROGRESS NOTES
[facility] - Outpatient Follow-up Note  Christine Alex 34 y.o. male MRN: [id number]  Encounter: [id number]    ASSESSMENT AND PLAN:      1. H. pylori infection  Patient was treated by my partner with quadruple therapy. He has not yet since then his stool test to confirm eradication. Once eradication is confirmed, he can go back on his PPI for reflux    Follow up appointment: As needed  ______________________________________________________________________    Chief Complaint   Patient presents with   • Follow-up     Pt states his bowel movements are normal consistency but frequency is irregular. HPI:   Patient is a 34 y.o. male with a significant PMH of schizophrenia presenting for follow up regarding H. pylori infection diagnosed on recent upper endoscopy with biopsy. He did complete quadruple therapy back in June, but never was checked for eradication. He is complaining of heartburn because he has been off his PPI to submit a stool sample. His colonoscopy showed a suboptimal prep, but no lesions were seen to account for his weight loss. Since seeing him 3 months ago he has actually gained weight and has no complaints. Historical Information   Past Medical History:   Diagnosis Date   • ADHD    • Anxiety    • Bacterial colitis 01/2019    in ER per GI notes   • Bilateral knee pain    • Depression    • Fatty liver     Noted on ultrasound, 2/19/19, normal LFTs 2/19/19   • GERD (gastroesophageal reflux disease)    • Hyperlipidemia    • Morbid obesity with BMI of 40.0-44.9, adult (HCC)    • Nicotine dependence    • ALEJANDRA on CPAP    • Psoriasis    • Schizoaffective disorder ([address] [facility])     Managed by psychiatry, Dr. Krishna Lane at [facility]     History reviewed. No pertinent surgical history.   Social History     Substance and Sexual Activity   Alcohol Use Not Currently     Social History     Substance and Sexual Activity   Drug Use Never     Social History     Tobacco Use   Smoking Status Some Days   • Types: Cigarettes   Smokeless Tobacco Never     Family History   Problem Relation Age of Onset   • No Known Problems Mother    • No Known Problems Father    • No Known Problems Brother    • No Known Problems Brother    • Colon cancer Neg Hx    • Colon polyps Neg Hx    • Inflammatory bowel disease Neg Hx          Current Outpatient Medications:   •  ABILIFY MAINTENA 300 MG injection  •  Albuterol Sulfate POWD  •  atorvastatin (LIPITOR) 80 mg tablet  •  benztropine (COGENTIN) 1 mg tablet  •  bismuth subsalicylate (PEPTO BISMOL) 262 MG chewable tablet  •  cloNIDine (CATAPRES) 0.1 mg tablet  •  Deutetrabenazine 12 MG TABS  •  escitalopram (LEXAPRO) 5 mg tablet  •  gabapentin (NEURONTIN) 400 mg capsule  •  naloxone (NARCAN) 4 mg/0.1 mL nasal spray  •  naltrexone (REVIA) 50 mg tablet  •  polyethylene glycol (MiraLax) 17 GM/SCOOP powder  •  QUEtiapine (SEROquel) 50 mg tablet  •  ALPRAZolam (XANAX) 1 mg tablet  •  amphetamine-dextroamphetamine (ADDERALL) 30 MG tablet  •  busPIRone (BUSPAR) 30 MG tablet  •  eszopiclone (LUNESTA) 2 mg tablet  •  NON-ASPIRIN PAIN RELIEF 325 MG tablet  •  omeprazole (PriLOSEC OTC) 20 MG tablet  •  omeprazole (PriLOSEC) 40 MG capsule  •  polyethylene glycol (Golytely) 4000 mL solution  •  traZODone (DESYREL) 100 mg tablet  •  triamcinolone (KENALOG) 0.5 % cream  •  venlafaxine (EFFEXOR-XR) 150 mg 24 hr capsule  •  venlafaxine (EFFEXOR-XR) 75 mg 24 hr capsule  •  zolpidem (AMBIEN) 10 mg tablet  No Known Allergies  Reviewed medications and allergies and updated as indicated    PHYSICAL EXAM:    Blood pressure 106/74, height 5' 10" (1.778 m), weight 116 kg (256 lb). Body mass index is 36.73 kg/m². General Appearance: NAD, cooperative, alert  Eyes: Anicteric  GI:  Soft, non-tender, non-distended; normal bowel sounds; no masses, no organomegaly   Rectal: Deferred  Musculoskeletal: No edema.   Skin:  No jaundice    Lab Results:   No results found for: "WBC", "HGB", "MCV", "PLT"  Lab Results   Component Value Date    AST 28 03/11/2020    ALT 35 03/11/2020    ALT 30 03/11/2020     No results found for: "IRON", "TIBC", "FERRITIN"  No results found for: "LIPASE"    Radiology Results:   No results found.

## 2023-09-05 ENCOUNTER — NURSE TRIAGE (OUTPATIENT)
Age: 38
End: 2023-09-05

## 2023-09-05 NOTE — TELEPHONE ENCOUNTER
----- Message from Saul Barbosa MA sent at 9/5/2023  3:52 PM EDT -----  Pt father called asking for results of last labs.  Please advise

## 2023-10-03 ENCOUNTER — HOSPITAL ENCOUNTER (EMERGENCY)
Facility: HOSPITAL | Age: 38
Discharge: HOME | End: 2023-10-03
Attending: EMERGENCY MEDICINE
Payer: COMMERCIAL

## 2023-10-03 ENCOUNTER — APPOINTMENT (EMERGENCY)
Dept: RADIOLOGY | Facility: HOSPITAL | Age: 38
End: 2023-10-03
Payer: COMMERCIAL

## 2023-10-03 VITALS
DIASTOLIC BLOOD PRESSURE: 69 MMHG | TEMPERATURE: 98.5 F | RESPIRATION RATE: 22 BRPM | HEART RATE: 91 BPM | SYSTOLIC BLOOD PRESSURE: 114 MMHG | OXYGEN SATURATION: 95 %

## 2023-10-03 DIAGNOSIS — K21.9 GERD (GASTROESOPHAGEAL REFLUX DISEASE): ICD-10-CM

## 2023-10-03 DIAGNOSIS — F41.9 ANXIETY: Primary | ICD-10-CM

## 2023-10-03 DIAGNOSIS — R07.9 CHEST PAIN WITH LOW RISK OF ACUTE CORONARY SYNDROME: ICD-10-CM

## 2023-10-03 LAB
ALBUMIN SERPL BCP-MCNC: 4.2 G/DL (ref 3.5–5)
ALP SERPL-CCNC: 69 U/L (ref 34–104)
ALT SERPL W P-5'-P-CCNC: 81 U/L (ref 7–52)
ANION GAP SERPL CALCULATED.3IONS-SCNC: 7 MMOL/L
AST SERPL W P-5'-P-CCNC: 24 U/L (ref 13–39)
BASOPHILS # BLD AUTO: 0.06 THOUSANDS/ÂΜL (ref 0–0.1)
BASOPHILS NFR BLD AUTO: 1 % (ref 0–1)
BILIRUB SERPL-MCNC: 0.54 MG/DL (ref 0.2–1)
BUN SERPL-MCNC: 10 MG/DL (ref 5–25)
CALCIUM SERPL-MCNC: 9.4 MG/DL (ref 8.4–10.2)
CARDIAC TROPONIN I PNL SERPL HS: 2 NG/L
CHLORIDE SERPL-SCNC: 103 MMOL/L (ref 96–108)
CO2 SERPL-SCNC: 28 MMOL/L (ref 21–32)
CREAT SERPL-MCNC: 0.85 MG/DL (ref 0.6–1.3)
EOSINOPHIL # BLD AUTO: 0.19 THOUSAND/ÂΜL (ref 0–0.61)
EOSINOPHIL NFR BLD AUTO: 3 % (ref 0–6)
ERYTHROCYTE [DISTWIDTH] IN BLOOD BY AUTOMATED COUNT: 13.1 % (ref 11.6–15.1)
GFR SERPL CREATININE-BSD FRML MDRD: 108 ML/MIN/1.73SQ M
GLUCOSE SERPL-MCNC: 106 MG/DL (ref 65–140)
HCT VFR BLD AUTO: 45.3 % (ref 36.5–49.3)
HGB BLD-MCNC: 14.9 G/DL (ref 12–17)
IMM GRANULOCYTES # BLD AUTO: 0.03 THOUSAND/UL (ref 0–0.2)
IMM GRANULOCYTES NFR BLD AUTO: 0 % (ref 0–2)
LIPASE SERPL-CCNC: 19 U/L (ref 11–82)
LYMPHOCYTES # BLD AUTO: 1.75 THOUSANDS/ÂΜL (ref 0.6–4.47)
LYMPHOCYTES NFR BLD AUTO: 23 % (ref 14–44)
MCH RBC QN AUTO: 28.7 PG (ref 26.8–34.3)
MCHC RBC AUTO-ENTMCNC: 32.9 G/DL (ref 31.4–37.4)
MCV RBC AUTO: 87 FL (ref 82–98)
MONOCYTES # BLD AUTO: 0.36 THOUSAND/ÂΜL (ref 0.17–1.22)
MONOCYTES NFR BLD AUTO: 5 % (ref 4–12)
NEUTROPHILS # BLD AUTO: 5.13 THOUSANDS/ÂΜL (ref 1.85–7.62)
NEUTS SEG NFR BLD AUTO: 68 % (ref 43–75)
NRBC BLD AUTO-RTO: 0 /100 WBCS
PLATELET # BLD AUTO: 255 THOUSANDS/UL (ref 149–390)
PMV BLD AUTO: 9.5 FL (ref 8.9–12.7)
POTASSIUM SERPL-SCNC: 4.1 MMOL/L (ref 3.5–5.3)
PROT SERPL-MCNC: 7.6 G/DL (ref 6.4–8.4)
RBC # BLD AUTO: 5.19 MILLION/UL (ref 3.88–5.62)
SODIUM SERPL-SCNC: 138 MMOL/L (ref 135–147)
WBC # BLD AUTO: 7.52 THOUSAND/UL (ref 4.31–10.16)

## 2023-10-03 PROCEDURE — 80053 COMPREHEN METABOLIC PANEL: CPT | Performed by: EMERGENCY MEDICINE

## 2023-10-03 PROCEDURE — 99285 EMERGENCY DEPT VISIT HI MDM: CPT | Performed by: EMERGENCY MEDICINE

## 2023-10-03 PROCEDURE — 71045 X-RAY EXAM CHEST 1 VIEW: CPT

## 2023-10-03 PROCEDURE — 36415 COLL VENOUS BLD VENIPUNCTURE: CPT | Performed by: EMERGENCY MEDICINE

## 2023-10-03 PROCEDURE — 99285 EMERGENCY DEPT VISIT HI MDM: CPT

## 2023-10-03 PROCEDURE — 93005 ELECTROCARDIOGRAM TRACING: CPT

## 2023-10-03 PROCEDURE — 96374 THER/PROPH/DIAG INJ IV PUSH: CPT

## 2023-10-03 PROCEDURE — 84484 ASSAY OF TROPONIN QUANT: CPT | Performed by: EMERGENCY MEDICINE

## 2023-10-03 PROCEDURE — 83690 ASSAY OF LIPASE: CPT | Performed by: EMERGENCY MEDICINE

## 2023-10-03 PROCEDURE — 85025 COMPLETE CBC W/AUTO DIFF WBC: CPT | Performed by: EMERGENCY MEDICINE

## 2023-10-03 RX ORDER — LORAZEPAM 2 MG/ML
0.5 INJECTION INTRAMUSCULAR ONCE
Status: COMPLETED | OUTPATIENT
Start: 2023-10-03 | End: 2023-10-03

## 2023-10-03 RX ORDER — FAMOTIDINE 20 MG/1
20 TABLET, FILM COATED ORAL ONCE
Status: COMPLETED | OUTPATIENT
Start: 2023-10-03 | End: 2023-10-03

## 2023-10-03 RX ADMIN — LORAZEPAM 0.5 MG: 2 INJECTION INTRAMUSCULAR; INTRAVENOUS at 11:27

## 2023-10-03 RX ADMIN — FAMOTIDINE 20 MG: 20 TABLET, FILM COATED ORAL at 11:27

## 2023-10-03 NOTE — ED PROVIDER NOTES
History  Chief Complaint   Patient presents with    Chest Pain     Pt said he woke up feeling anxious, pt says he is having chest pain. Pt reports that it feels like an anxiety attacked. Panic Attack     Pt states he is having a panic attack and a lot of anxiety. Pt took a long walk in the heat yesterday. 57-year-old male presents for evaluation of nonradiating central sharp chest pain. The patient states that he has had similar events in the past related to anxiety. The patient states that he felt a little stressed last evening and symptoms got worse after he had some pizza with his father. He states that yesterday was stressful for him as had a walk in the [employer]. Patient awoke this morning with worsening pain. He states that in the past he has taken Xanax or Ativan for this type of chest pain however he does not have any currently prescribed. Chest Pain  Panic Attack  Associated symptoms: chest pain        Prior to Admission Medications   Prescriptions Last Dose Informant Patient Reported? Taking?    ABILIFY MAINTENA 300 MG injection Not Taking Self Yes No   Patient not taking: Reported on 10/3/2023   ALPRAZolam (XANAX) 1 mg tablet Not Taking Self Yes No   Patient not taking: Reported on 5/3/2023   Albuterol Sulfate POWD 10/3/2023 Self Yes Yes   Sig: Use   Deutetrabenazine 12 MG TABS 10/3/2023 Self Yes Yes   Sig: Take by mouth   NON-ASPIRIN PAIN RELIEF 325 MG tablet Not Taking Self Yes No   Patient not taking: Reported on 5/3/2023   QUEtiapine (SEROquel) 50 mg tablet 10/3/2023 Self Yes Yes   amphetamine-dextroamphetamine (ADDERALL) 30 MG tablet Not Taking Self Yes No   Patient not taking: Reported on 5/3/2023   atorvastatin (LIPITOR) 80 mg tablet 10/3/2023 Self Yes Yes   benztropine (COGENTIN) 1 mg tablet 10/3/2023 Self Yes Yes   bismuth subsalicylate (PEPTO BISMOL) 262 MG chewable tablet Past Month Self No Yes   Sig: Chew 1 tablet (262 mg total) 4 (four) times a day (with meals and at bedtime)   Patient taking differently: Chew 262 mg 4 (four) times a day (with meals and at bedtime) Pt taking PRN   busPIRone (BUSPAR) 30 MG tablet Not Taking Self Yes No   Patient not taking: Reported on 5/3/2023   cloNIDine (CATAPRES) 0.1 mg tablet 10/3/2023 Self Yes Yes   escitalopram (LEXAPRO) 5 mg tablet 10/3/2023 Self Yes Yes   Sig: Take 10 mg by mouth daily   eszopiclone (LUNESTA) 2 mg tablet Not Taking Self Yes No   Patient not taking: Reported on 5/3/2023   gabapentin (NEURONTIN) 400 mg capsule 10/3/2023 Self Yes Yes   naloxone (NARCAN) 4 mg/0.1 mL nasal spray Unknown Self Yes No   naltrexone (REVIA) 50 mg tablet Past Month Self Yes Yes   Sig: Take 380 mg by mouth daily   omeprazole (PriLOSEC OTC) 20 MG tablet   No No   Sig: Take 1 tablet (20 mg total) by mouth 2 (two) times a day before breakfast and lunch for 14 days   omeprazole (PriLOSEC) 40 MG capsule Not Taking Self No No   Sig: Take 1 capsule (40 mg total) by mouth daily   Patient not taking: Reported on 8/17/2023   polyethylene glycol (Golytely) 4000 mL solution   No No   Sig: Take 4,000 mL by mouth once for 1 dose Take 4000 mL by mouth once for 1 dose. Use as directed   polyethylene glycol (MiraLax) 17 GM/SCOOP powder Past Month Self No Yes   Sig: Take 17 g by mouth daily Take 238 g my mouth. Use as directed   Patient taking differently: Take 17 g by mouth daily Take 238 g my mouth.  Use as directed  Pt taking PRN   traZODone (DESYREL) 100 mg tablet Not Taking Self Yes No   Patient not taking: Reported on 5/3/2023   triamcinolone (KENALOG) 0.5 % cream Not Taking Self Yes No   Patient not taking: Reported on 5/3/2023   venlafaxine (EFFEXOR-XR) 150 mg 24 hr capsule Not Taking Self Yes No   Patient not taking: Reported on 5/3/2023   venlafaxine (EFFEXOR-XR) 75 mg 24 hr capsule Not Taking Self Yes No   Patient not taking: Reported on 5/3/2023   zolpidem (AMBIEN) 10 mg tablet Not Taking Self Yes No   Patient not taking: Reported on 8/17/2023 Facility-Administered Medications: None       Past Medical History:   Diagnosis Date    ADHD     Anxiety     Bacterial colitis 01/2019    in ER per GI notes    Bilateral knee pain     Depression     Fatty liver     Noted on ultrasound, 2/19/19, normal LFTs 2/19/19    GERD (gastroesophageal reflux disease)     Hyperlipidemia     Morbid obesity with BMI of 40.0-44.9, adult (HCC)     Nicotine dependence     ALEJANDRA on CPAP     Psoriasis     Schizoaffective disorder ([address])     Managed by psychiatry, Dr. Olivia Bucio at        History reviewed. No pertinent surgical history. Family History   Problem Relation Age of Onset    No Known Problems Mother     No Known Problems Father     No Known Problems Brother     No Known Problems Brother     Colon cancer Neg Hx     Colon polyps Neg Hx     Inflammatory bowel disease Neg Hx      I have reviewed and agree with the history as documented. E-Cigarette/Vaping    E-Cigarette Use Never User      E-Cigarette/Vaping Substances    Nicotine No     THC No     CBD No     Flavoring No     Other No     Unknown No      Social History     Tobacco Use    Smoking status: Some Days     Types: Cigarettes    Smokeless tobacco: Never   Vaping Use    Vaping Use: Never used   Substance Use Topics    Alcohol use: Not Currently    Drug use: Never       Review of Systems   Cardiovascular:  Positive for chest pain.        Physical Exam  Physical Exam    Vital Signs  ED Triage Vitals   Temperature Pulse Respirations Blood Pressure SpO2   10/03/23 1118 10/03/23 1114 10/03/23 1114 10/03/23 1114 10/03/23 1114   98.5 °F (36.9 °C) 98 18 149/82 95 %      Temp Source Heart Rate Source Patient Position - Orthostatic VS BP Location FiO2 (%)   10/03/23 1118 10/03/23 1130 10/03/23 1130 10/03/23 1130 --   Temporal Monitor Sitting Right arm       Pain Score       --                  Vitals:    10/03/23 1114 10/03/23 1130 10/03/23 1200   BP: 149/82 126/80 114/69   Pulse: 98 90 91   Patient Position - Orthostatic VS:  Sitting          Visual Acuity      ED Medications  Medications   LORazepam (ATIVAN) injection 0.5 mg (0.5 mg Intravenous Given 10/3/23 1127)   famotidine (PEPCID) tablet 20 mg (20 mg Oral Given 10/3/23 1127)       Diagnostic Studies  Results Reviewed       Procedure Component Value Units Date/Time    HS Troponin 0hr (reflex protocol) [[id number]]  (Normal) Collected: 10/03/23 1127    Lab Status: Final result Specimen: Blood from Arm, Left Updated: 10/03/23 1159     hs TnI 0hr 2 ng/L     Comprehensive metabolic panel [[id number]]  (Abnormal) Collected: 10/03/23 1127    Lab Status: Final result Specimen: Blood from Arm, Left Updated: 10/03/23 1151     Sodium 138 mmol/L      Potassium 4.1 mmol/L      Chloride 103 mmol/L      CO2 28 mmol/L      ANION GAP 7 mmol/L      BUN 10 mg/dL      Creatinine 0.85 mg/dL      Glucose 106 mg/dL      Calcium 9.4 mg/dL      AST 24 U/L      ALT 81 U/L      Alkaline Phosphatase 69 U/L      Total Protein 7.6 g/dL      Albumin 4.2 g/dL      Total Bilirubin 0.54 mg/dL      eGFR 108 ml/min/1.73sq m     Narrative:      [place] guidelines for Chronic Kidney Disease (CKD):     Stage 1 with normal or high GFR (GFR > 90 mL/min/1.73 square meters)    Stage 2 Mild CKD (GFR = 60-89 mL/min/1.73 square meters)    Stage 3A Moderate CKD (GFR = 45-59 mL/min/1.73 square meters)    Stage 3B Moderate CKD (GFR = 30-44 mL/min/1.73 square meters)    Stage 4 Severe CKD (GFR = 15-29 mL/min/1.73 square meters)    Stage 5 End Stage CKD (GFR <15 mL/min/1.73 square meters)  Note: GFR calculation is accurate only with a steady state creatinine    Lipase [[id number]]  (Normal) Collected: 10/03/23 1127    Lab Status: Final result Specimen: Blood from Arm, Left Updated: 10/03/23 1151     Lipase 19 u/L     CBC and differential [[id number]] Collected: 10/03/23 1127    Lab Status: Final result Specimen: Blood from Arm, Left Updated: 10/03/23 1136     WBC 7.52 Thousand/uL      RBC 5.19 Million/uL      Hemoglobin 14.9 g/dL      Hematocrit 45.3 %      MCV 87 fL      MCH 28.7 pg      MCHC 32.9 g/dL      RDW 13.1 %      MPV 9.5 fL      Platelets 111 Thousands/uL      nRBC 0 /100 WBCs      Neutrophils Relative 68 %      Immat GRANS % 0 %      Lymphocytes Relative 23 %      Monocytes Relative 5 %      Eosinophils Relative 3 %      Basophils Relative 1 %      Neutrophils Absolute 5.13 Thousands/µL      Immature Grans Absolute 0.03 Thousand/uL      Lymphocytes Absolute 1.75 Thousands/µL      Monocytes Absolute 0.36 Thousand/µL      Eosinophils Absolute 0.19 Thousand/µL      Basophils Absolute 0.06 Thousands/µL                    XR chest 1 view portable   Final Result by Cassie Ma MD (10/03 1354)      No acute cardiopulmonary disease. Resident: Alpa Zelaya, the attending radiologist, have reviewed the images and agree with the final report above. Workstation performed: [id number]                    Procedures  ECG 12 Lead Documentation Only    Date/Time: 10/3/2023 11:20 AM    Performed by: Grant Carpenter DO  Authorized by: Grant Carpenter DO    Indications / Diagnosis:  Chest pain  ECG reviewed by me, the ED Provider: yes    Patient location:  ED  Previous ECG:     Previous ECG:  Unavailable  Interpretation:     Interpretation: normal    Rate:     ECG rate:  87    ECG rate assessment: normal    Rhythm:     Rhythm: sinus rhythm    Ectopy:     Ectopy: none    QRS:     QRS axis:  Normal    QRS intervals:  Normal  Conduction:     Conduction: normal    ST segments:     ST segments:  Normal  T waves:     T waves: normal             ED Course  ED Course as of 10/03/23 [place] Oct 03, 2023   1202 Improving upon reevaluation.              HEART Risk Score      Flowsheet Row Most Recent Value   Heart Score Risk Calculator    History 0 Filed at: 10/03/2023 1231   ECG 0 Filed at: 10/03/2023 1231   Age 0 Filed at: 10/03/2023 1231   Risk Factors 1 Filed at: 10/03/2023 1231   Troponin 0 Filed at: 10/03/2023 1231   HEART Score 1 Filed at: 10/03/2023 1231                          SBIRT 22yo+      Flowsheet Row Most Recent Value   Initial Alcohol Screen: US AUDIT-C     1. How often do you have a drink containing alcohol? 0 Filed at: 10/03/2023 1206   2. How many drinks containing alcohol do you have on a typical day you are drinking? 0 Filed at: 10/03/2023 1206   3a. Male UNDER 65: How often do you have five or more drinks on one occasion? 0 Filed at: 10/03/2023 1206   3b. FEMALE Any Age, or MALE 65+: How often do you have 4 or more drinks on one occassion? 0 Filed at: 10/03/2023 1206   Audit-C Score 0 Filed at: 10/03/2023 1206   RACHELL: How many times in the past year have you. .. Used an illegal drug or used a prescription medication for non-medical reasons? Never Filed at: 10/03/2023 1206                      Medical Decision Making  Differential diagnosis: Anxiety, GERD, chest wall pain, pleurisy, pneumonia, doubt acute coronary syndrome    Discussed unremarkable work-up and need for outpatient follow-up with psychiatry for further management of the patient's recurrent anxiety and panic attacks. The patient (and any family present) verbalized understanding of the discharge instructions and warnings that would necessitate return to the Emergency Department. All questions were answered prior to discharge. Amount and/or Complexity of Data Reviewed  External Data Reviewed: notes. Details: GI notes reviewed  Labs: ordered. Radiology: ordered. Risk  Prescription drug management.         Disposition  Final diagnoses:   Anxiety   Chest pain with low risk of acute coronary syndrome   GERD (gastroesophageal reflux disease)     Time reflects when diagnosis was documented in both MDM as applicable and the Disposition within this note       Time User Action Codes Description Comment    10/3/2023 12:31 PM Bandar Robison [F41.9] Anxiety     10/3/2023 12:31 PM Mejia Tavares Add [R07.9] Chest pain with low risk of acute coronary syndrome     10/3/2023 12:31 PM Roxana WHITE Add [K21.9] GERD (gastroesophageal reflux disease)           ED Disposition       ED Disposition   Discharge    Condition   Stable    Date/Time   Tue Oct 3, 2023 [address] [facility] Robert discharge to home/self care.                    Follow-up Information       Follow up With Specialties Details Why Contact Info Additional 2301 Marlys Choudhury MD Family Medicine Schedule an appointment as soon as possible for a visit  For further evaluation of your anxiety 3523 Rakesh Ruano [phone] [facility] Emergency Department Emergency Medicine Go to  If symptoms worsen [address] [facility] [phone]  [address] [facility] Emergency Department, [address] [facility], [place], [address]            Discharge Medication List as of 10/3/2023 12:32 PM        CONTINUE these medications which have NOT CHANGED    Details   Albuterol Sulfate POWD Use, Historical Med      atorvastatin (LIPITOR) 80 mg tablet Starting Mon 2/25/2019, Historical Med      benztropine (COGENTIN) 1 mg tablet Starting Fri 1/11/2019, Historical Med      bismuth subsalicylate (PEPTO BISMOL) 262 MG chewable tablet Chew 1 tablet (262 mg total) 4 (four) times a day (with meals and at bedtime), Starting Tue 6/6/2023, Normal      cloNIDine (CATAPRES) 0.1 mg tablet Starting Wed 4/26/2023, Historical Med      Deutetrabenazine 12 MG TABS Take by mouth, Historical Med      escitalopram (LEXAPRO) 5 mg tablet Take 10 mg by mouth daily, Starting Wed 4/26/2023, Historical Med      gabapentin (NEURONTIN) 400 mg capsule Starting Wed 8/16/2023, Historical Med      naltrexone (REVIA) 50 mg tablet Take 380 mg by mouth daily, Historical Med      polyethylene glycol (MiraLax) 17 GM/SCOOP powder Take 17 g by mouth daily Take 238 g my mouth. Use as directed, Starting Wed 5/3/2023, Normal      QUEtiapine (SEROquel) 50 mg tablet Starting Wed 4/26/2023, Historical Med      ABILIFY MAINTENA 300 MG injection Starting Tue 3/12/2019, Historical Med      ALPRAZolam Nasra Shearing) 1 mg tablet Starting Thu 3/7/2019, Historical Med      amphetamine-dextroamphetamine (ADDERALL) 30 MG tablet Starting Wed 3/20/2019, Historical Med      busPIRone (BUSPAR) 30 MG tablet Starting Wed 2/6/2019, Historical Med      eszopiclone (LUNESTA) 2 mg tablet Starting Thu 3/7/2019, Historical Med      naloxone (NARCAN) 4 mg/0.1 mL nasal spray Historical Med      NON-ASPIRIN PAIN RELIEF 325 MG tablet Starting Tue 1/8/2019, Historical Med      omeprazole (PriLOSEC OTC) 20 MG tablet Take 1 tablet (20 mg total) by mouth 2 (two) times a day before breakfast and lunch for 14 days, Starting Tue 6/6/2023, Until Tue 6/20/2023, Normal      omeprazole (PriLOSEC) 40 MG capsule Take 1 capsule (40 mg total) by mouth daily, Starting Thu 1/28/2021, Normal      polyethylene glycol (Golytely) 4000 mL solution Take 4,000 mL by mouth once for 1 dose Take 4000 mL by mouth once for 1 dose. Use as directed, Starting Wed 5/3/2023, Normal      traZODone (DESYREL) 100 mg tablet Starting Thu 3/7/2019, Historical Med      triamcinolone (KENALOG) 0.5 % cream Starting Tue 1/8/2019, Historical Med      !! venlafaxine (EFFEXOR-XR) 150 mg 24 hr capsule Starting Wed 3/20/2019, Historical Med      !! venlafaxine (EFFEXOR-XR) 75 mg 24 hr capsule Starting Thu 3/7/2019, Historical Med      zolpidem (AMBIEN) 10 mg tablet Starting Wed 4/26/2023, Historical Med       !! - Potential duplicate medications found. Please discuss with provider. No discharge procedures on file.     PDMP Review       None            ED Provider  Electronically Signed by             Loan Guidry DO  10/03/23 2723

## 2023-10-08 LAB
ATRIAL RATE: 87 BPM
P AXIS: 32 DEGREES
PR INTERVAL: 140 MS
QRS AXIS: 34 DEGREES
QRSD INTERVAL: 98 MS
QT INTERVAL: 360 MS
QTC INTERVAL: 433 MS
T WAVE AXIS: 76 DEGREES
VENTRICULAR RATE: 87 BPM

## 2023-10-08 PROCEDURE — 93010 ELECTROCARDIOGRAM REPORT: CPT | Performed by: INTERNAL MEDICINE

## 2024-02-21 PROBLEM — Z12.11 COLON CANCER SCREENING: Status: RESOLVED | Noted: 2019-03-21 | Resolved: 2024-02-21

## 2024-02-26 ENCOUNTER — HOSPITAL ENCOUNTER (EMERGENCY)
Facility: HOSPITAL | Age: 39
Discharge: HOME | End: 2024-02-26
Attending: EMERGENCY MEDICINE
Payer: COMMERCIAL

## 2024-02-26 VITALS
OXYGEN SATURATION: 93 % | DIASTOLIC BLOOD PRESSURE: 93 MMHG | SYSTOLIC BLOOD PRESSURE: 170 MMHG | RESPIRATION RATE: 18 BRPM | TEMPERATURE: 98 F | HEART RATE: 99 BPM

## 2024-02-26 DIAGNOSIS — R44.3 HALLUCINATIONS: Primary | ICD-10-CM

## 2024-02-26 LAB
AMPHETAMINES SERPL QL SCN: NEGATIVE
BARBITURATES UR QL: NEGATIVE
BENZODIAZ UR QL: NEGATIVE
COCAINE UR QL: NEGATIVE
ETHANOL EXG-MCNC: 0 MG/DL
METHADONE UR QL: NEGATIVE
OPIATES UR QL SCN: NEGATIVE
OXYCODONE+OXYMORPHONE UR QL SCN: NEGATIVE
PCP UR QL: NEGATIVE
THC UR QL: NEGATIVE

## 2024-02-26 PROCEDURE — 80307 DRUG TEST PRSMV CHEM ANLYZR: CPT | Performed by: EMERGENCY MEDICINE

## 2024-02-26 PROCEDURE — 99284 EMERGENCY DEPT VISIT MOD MDM: CPT

## 2024-02-26 PROCEDURE — 82075 ASSAY OF BREATH ETHANOL: CPT | Performed by: EMERGENCY MEDICINE

## 2024-02-26 PROCEDURE — 99284 EMERGENCY DEPT VISIT MOD MDM: CPT | Performed by: EMERGENCY MEDICINE

## 2024-02-26 RX ORDER — QUETIAPINE FUMARATE 100 MG/1
100 TABLET, FILM COATED ORAL ONCE
Status: COMPLETED | OUTPATIENT
Start: 2024-02-26 | End: 2024-02-26

## 2024-02-26 RX ADMIN — QUETIAPINE FUMARATE 100 MG: 100 TABLET ORAL at 13:13

## 2024-02-26 NOTE — ED NOTES
Pt confirmed that he is on [facility] Act Team.  Call placed to act with pt permission to obtain medication list.   Med list to be faxed.

## 2024-02-26 NOTE — CONSULTS
TELEConsultation - Behavioral Health   Rush Briscoe 38 y.o. male MRN: [id number]  Unit/Bed#: SH 02 Encounter: [id number]    REQUIRED DOCUMENTATION:     1. This service was provided via Telemedicine.  2. Provider located in [place].  3. TeleMed provider: Hugh Torres MD  4. Identify all parties in room with patient during tele consult: Patient  5. After connecting through televideo, patient was identified by name and date of birth. Parent/patient was then informed that this was being conducted confidentially over secure lines. My office door was closed. Parties in the room listed above as per #4.  Patient acknowledged consent and understanding of privacy and security of the Telemedicine visit. The patient is aware this is a billable service and understands that he can discontinue the visit at any time. I informed the patient that I have reviewed their record in Epic and presented the opportunity for them to ask any questions regarding the visit today.  The patient agreed to participate.       Chief Complaint: CAH to hurt self    History of Present Illness   Physician Requesting Consult: Alex Fung DO    Reason for Consult / Principal Problem: CAH to hurt self    Per Crisis Worker: Pt presented to the ED with voices telling him to harm himself. Pt reported that [facility] ACT is aware and Dr Gardiner did not make medication changes. Pt reported that he lives independently. Pt reported he feels safe and does not have access to firearms. Pt is currently unemployed. Pt reported no current MAT. Pt denies drug and alcohol use. Pt denies any acute medical issues. Pt reported no legal issues. Pt reported 1 cigarette a day. Pt denies suicidal ideation and has never attempted. Pt reported no self injurious history. Pt denies homicidal ideation. Pt reported no history of violence. Pt reported no other symptoms of psychosis other than auditory hallucinations. Attending provider notified of eval.     On evaluation,    Rush  was calm and cooperative with interview.  He is pleasant and engaged.  Thought process is somewhat concrete but he generally is a good historian and able to participate effectively in interview.  Reports that he came to the hospital because 1 week ago he began having worsening auditory hallucinations.  He has had auditory hallucinations for many years but they had improved after initiation of Seroquel.  He reports that he feels the Seroquel has not been as effective recently in treating his auditory hallucinations.  We examined his medication list together that was sent over by his ACT team.  He reports that he has been taking Seroquel 50 mg twice daily instead of Seroquel 100 mg daily, 50 mg every afternoon, and 100 mg at bedtime.  He feels that his listed medication regimen of totaling 250 mg daily would be effective for his auditory hallucinations and would like to increase his dose from 50 mg twice daily to the listed regimen.  He also requests a one-time dose of 100 mg Seroquel for his auditory hallucinations at this time.  He denies any other problems with his medication list.  He reports that although auditory hallucinations tell him to hurt himself or cut his arm he is able to ignore them and does not intend to act on them.  He reports that when he hears these voices he gilma with them by calling someone, watching movies, or going to [employer].  He reports that he is not depressed and denies any suicidal ideation, intention, or plan.  Reports that his mood, sleep, appetite have been good.  Has strong support from his parents.  Has ACT team with whom he follows up regularly.  Denies HI or VH.  Last hospitalization was last year.  He is not interested in inpatient psychiatric hospitalization and expresses understanding of safety plan to call ACT team or crisis line if he feels he is unable to ignore his auditory hallucinations or they become particularly bothersome.  Also counseled that he may return to the  hospital if symptoms persist.    Psychiatric Review Of Systems:  Medication side effects: none  Sleep: no change  Appetite: no change  Hygiene: able to tend to instrumental and basic ADLs  Anxiety Symptoms: denies  Psychotic Symptoms: Endorses as per HPI  Depression Symptoms: denies  Manic Symptoms: denies  PTSD Symptoms: denies  Suicidal Thoughts: denies  Homicidal Thoughts: denies    Historical Information   Psychiatric History:   Diagnoses: schizoaffective disorder  Inpatient Hx: multiple, last time last year  Outpatient Hx: [facility]  Medications/Trials: Abilify, Zyprexa, Seroquel    Substance Abuse History:    Social History     Substance and Sexual Activity   Alcohol Use Not Currently     Social History     Substance and Sexual Activity   Drug Use Never       I discussed substance abuse with the patient and, if pertinent, discussed risks vs benefits of decreasing frequency of use.    Family History:   Family History   Problem Relation Age of Onset    No Known Problems Mother     No Known Problems Father     No Known Problems Brother     No Known Problems Brother     Colon cancer Neg Hx     Colon polyps Neg Hx     Inflammatory bowel disease Neg Hx        Social History  Born: [place]  Currently living: Alone   Relationships: Single  Children: None  Occupation: None  Rest of social history as per below:  Social History     Socioeconomic History    Marital status: Single     Spouse name: Not on file    Number of children: Not on file    Years of education: Not on file    Highest education level: Not on file   Occupational History    Not on file   Tobacco Use    Smoking status: Some Days     Types: Cigarettes    Smokeless tobacco: Never   Vaping Use    Vaping status: Never Used   Substance and Sexual Activity    Alcohol use: Not Currently    Drug use: Never    Sexual activity: Not on file   Other Topics Concern    Not on file   Social History Narrative    Not on file     Social Determinants of Health      Financial Resource Strain: Not on file   Food Insecurity: Not on file   Transportation Needs: Not on file   Physical Activity: Not on file   Stress: Not on file   Social Connections: Not on file   Intimate Partner Violence: Not on file   Housing Stability: Not on file       Past Medical History:   Diagnosis Date    ADHD     Anxiety     Bacterial colitis 01/2019    in ER per GI notes    Bilateral knee pain     Depression     Fatty liver     Noted on ultrasound, 2/19/19, normal LFTs 2/19/19    GERD (gastroesophageal reflux disease)     Hyperlipidemia     Morbid obesity with BMI of 40.0-44.9, adult (HCC)     Nicotine dependence     ALEJANDRA on CPAP     Psoriasis     Schizoaffective disorder (HCC)     Managed by psychiatry, Dr. Marcin Byrnes at [facility]       Medical Review Of Systems:  Patient denies headache or dizziness.   Patient denies chest pain or palpitations.  Patient denies difficulty breathing or wheezing.  Patient denies nausea, vomiting, or diarrhea.  Patient denies polyuria or polydipsia.  Patient denies weakness or numbness.  Pertinent positives as per HPI.    Meds/Allergies   No Known Allergies  No current facility-administered medications for this encounter.       Current Medications:  Current medications as per above. All medications have been reviewed.   Risks, benefits, alternatives, and possible side effects of patient's psychiatric medications were discussed with patient.     Objective   Vital signs in last 24 hours:  Temp:  [98 °F (36.7 °C)] 98 °F (36.7 °C)  HR:  [99] 99  Resp:  [18] 18  BP: (170)/(93) 170/93    Mental Status Exam:  Appearance: appears consistent with stated age  Motor: no psychomotor disturbances, no gait abnormalities  Behavior: Calm and cooperative  Speech: normal rate, decreased prosody  Mood: Good  Affect: Blunted  Thought Process: Goal-directed, concrete  Thought Content: Endorses command auditory hallucinations to cut arm, denies visual hallucinations, denies  delusions  Risk Potential: denies suicidal ideation, plan, or intent. Denies homicidal ideation  Sensorium: Oriented to person, place, time, and situation  Cognition: cognitive ability appears intact but was not quantitatively tested  Consciousness: alert and awake  Attention: currently impaired  Insight: limited  Judgement: limited      Laboratory results:  I have personally reviewed all pertinent laboratory/tests results.  Recent Results (from the past 48 hour(s))   Rapid drug screen, urine    Collection Time: 02/26/24 11:42 AM   Result Value Ref Range    Amph/Meth UR Negative Negative    Barbiturate Ur Negative Negative    Benzodiazepine Urine Negative Negative    Cocaine Urine Negative Negative    Methadone Urine Negative Negative    Opiate Urine Negative Negative    PCP Ur Negative Negative    THC Urine Negative Negative    Oxycodone Urine Negative Negative   POCT alcohol breath test    Collection Time: 02/26/24 11:46 AM   Result Value Ref Range    EXTBreath Alcohol 0.000           Assessment/Plan     Assessment: 38-year-old man with schizoaffective disorder presents to the ED complaining of worsening command auditory hallucinations to harm himself.  He reports that the voices tell him to cut his arm.  The voices are distressing to him but he reports that he does not intend to act on what the voices are telling him to do.  He reports that he is easily able to control himself and distract himself by doing something that makes him feel better.  He has good social supports and ACT team that he follows up with regularly.  He denies any suicidal ideation, intention, or plan.  Denies HI or VH.  We reviewed his medication regimen and he reports that he has been taking a lower dose of Seroquel that is listed on his medication regimen.  He is amenable to the increased dose that is listed on his medications sent from his ACT team at [facility].  Amenable to a one-time dose of Seroquel 100 mg now and to follow-up  with ACT team for prescription of Seroquel 100 mg daily, 50 mg every 3 PM, and 100 mg at bedtime.  Patient expressed understanding of safety plan to call crisis or 911 if having suicidal thoughts or planning on acting on his auditory hallucinations.  He also expressed understanding that return to the hospital is also an option if he is experiencing auditory hallucinations that are extremely distressing or suicidal ideations.  Denies HI or VH.  No intention to harm self, no complaints about mood and denies depressed mood.  Thought process generally goal-directed and organized.  Does not meet criteria for inpatient psychiatric hospitalization on an involuntary basis.  Not interested in voluntary psychiatric hospitalization.    Diagnosis: Schizoaffective disorder    Recommendations:   --Patient has been taking Seroquel 50 mg twice daily, medication list shows Seroquel 250 mg total daily, should increase outpatient Seroquel regimen to 100 mg in the morning, 50 mg in the afternoon, and 100 mg at night  --Safety plan discussed with patient as detailed above  --Does not meet involuntary psychiatric hospitalization criteria, not interested in voluntary admission on 201  --Follow-up with ACT team at [facility]  --Please TigerText with any questions or concerns.    Diagnoses, available treatment options, alternatives to treatment, and risks vs. benefits of current psychiatric treatment plan were discussed with the patient.  Prior records were reviewed in [facility].  The case was discussed with the primary team.    Hugh Torres MD    This note has been constructed using a voice recognition system. There may be translation, syntax, or grammatical errors. If you have any questions, please contact the dictating provider.

## 2024-02-26 NOTE — ED NOTES
[place] PROMIS[place]    [facility]  ID:  [id number]    Secondary  [facility]  Recipient ID: [id number]

## 2024-02-26 NOTE — ED NOTES
This writer discussed the patients current presentation and recommended discharge plan with Dr. Torres They agree with the patient being discharged at this time.     The patient was Instructed to follow up with their ACT team      The patient declined to complete a safety plan however a blank plan was provided for future use.  In addition, the patient was instructed to call local [facility] crisis, other crisis services, 911 or to go to the nearest ER immediately if their situation changes at any time.     This writer discussed discharge plans with the patient who agrees with and understands the discharge plans.         SAFETY PLAN  Warning Signs (thoughts, images, mood, behavior, situations) of a potential crisis:       Coping Skills (what can I do to take my mind off the problem, or to keep myself safe):       Outside Support (who can I reach out to for support and help):         National Suicide Prevention Hotline:  [address] [place] [address] [phone] - [facility] [phone] - [facility] Crisis/Mobile Crisis   [phone] - [facility]   [facility]: [phone]  [facility]: [phone]   [facility] [phone] - Crisis   [facility] [phone] - Crisis     [facility] [phone] - Crisis   [facility] [phone] - Crisis   [phone] - Peer Support Talk Line (1-9pm daily)  [phone] - Teen Support Talk Line (1-9pm daily)  [phone] - [facility] [phone]- Crisis    [facility] [phone] - Crisis   [facility] [phone] - Crisis    [facility]) [phone] - [facility]

## 2024-02-26 NOTE — ED PROVIDER NOTES
"History  Chief Complaint   Patient presents with    Psychiatric Evaluation     Patient states \" I'm hearing voices this morning telling me to cut my arms \"     38-year-old male presents for evaluation of audio hallucinations.  Patient reports hearing voices telling him to cut himself.  Does have a history of audio hallucinations but states he has never acted on them.  Patient has no intention of harming himself and he does not want to harm himself.  He was just concerned because he started hearing voices this morning.  No further complaints at this time.        Prior to Admission Medications   Prescriptions Last Dose Informant Patient Reported? Taking?   ABILIFY MAINTENA 300 MG injection  Self Yes No   Patient not taking: Reported on 10/3/2023   ALPRAZolam (XANAX) 1 mg tablet  Self Yes No   Patient not taking: Reported on 5/3/2023   Albuterol Sulfate POWD  Self Yes No   Sig: Use   Deutetrabenazine 12 MG TABS  Self Yes No   Sig: Take by mouth   NON-ASPIRIN PAIN RELIEF 325 MG tablet  Self Yes No   Patient not taking: Reported on 5/3/2023   QUEtiapine (SEROquel) 50 mg tablet  Self Yes No   amphetamine-dextroamphetamine (ADDERALL) 30 MG tablet  Self Yes No   Patient not taking: Reported on 5/3/2023   atorvastatin (LIPITOR) 80 mg tablet  Self Yes No   benztropine (COGENTIN) 1 mg tablet  Self Yes No   bismuth subsalicylate (PEPTO BISMOL) 262 MG chewable tablet  Self No No   Sig: Chew 1 tablet (262 mg total) 4 (four) times a day (with meals and at bedtime)   Patient taking differently: Chew 262 mg 4 (four) times a day (with meals and at bedtime) Pt taking PRN   busPIRone (BUSPAR) 30 MG tablet  Self Yes No   Patient not taking: Reported on 5/3/2023   cloNIDine (CATAPRES) 0.1 mg tablet  Self Yes No   escitalopram (LEXAPRO) 5 mg tablet  Self Yes No   Sig: Take 10 mg by mouth daily   eszopiclone (LUNESTA) 2 mg tablet  Self Yes No   Patient not taking: Reported on 5/3/2023   gabapentin (NEURONTIN) 400 mg capsule  Self Yes No "   naloxone (NARCAN) 4 mg/0.1 mL nasal spray  Self Yes No   naltrexone (REVIA) 50 mg tablet  Self Yes No   Sig: Take 380 mg by mouth daily   omeprazole (PriLOSEC OTC) 20 MG tablet   No No   Sig: Take 1 tablet (20 mg total) by mouth 2 (two) times a day before breakfast and lunch for 14 days   omeprazole (PriLOSEC) 40 MG capsule  Self No No   Sig: Take 1 capsule (40 mg total) by mouth daily   Patient not taking: Reported on 8/17/2023   polyethylene glycol (Golytely) 4000 mL solution   No No   Sig: Take 4,000 mL by mouth once for 1 dose Take 4000 mL by mouth once for 1 dose. Use as directed   polyethylene glycol (MiraLax) 17 GM/SCOOP powder  Self No No   Sig: Take 17 g by mouth daily Take 238 g my mouth. Use as directed   Patient taking differently: Take 17 g by mouth daily Take 238 g my mouth. Use as directed  Pt taking PRN   traZODone (DESYREL) 100 mg tablet  Self Yes No   Patient not taking: Reported on 5/3/2023   triamcinolone (KENALOG) 0.5 % cream  Self Yes No   Patient not taking: Reported on 5/3/2023   venlafaxine (EFFEXOR-XR) 150 mg 24 hr capsule  Self Yes No   Patient not taking: Reported on 5/3/2023   venlafaxine (EFFEXOR-XR) 75 mg 24 hr capsule  Self Yes No   Patient not taking: Reported on 5/3/2023   zolpidem (AMBIEN) 10 mg tablet  Self Yes No   Patient not taking: Reported on 8/17/2023      Facility-Administered Medications: None       Past Medical History:   Diagnosis Date    ADHD     Anxiety     Bacterial colitis 01/2019    in ER per GI notes    Bilateral knee pain     Depression     Fatty liver     Noted on ultrasound, 2/19/19, normal LFTs 2/19/19    GERD (gastroesophageal reflux disease)     Hyperlipidemia     Morbid obesity with BMI of 40.0-44.9, adult (HCC)     Nicotine dependence     ALEJANDRA on CPAP     Psoriasis     Schizoaffective disorder (HCC)     Managed by psychiatry, Dr. Marcin Byrnes at [facility]       History reviewed. No pertinent surgical history.    Family History   Problem Relation  Age of Onset    No Known Problems Mother     No Known Problems Father     No Known Problems Brother     No Known Problems Brother     Colon cancer Neg Hx     Colon polyps Neg Hx     Inflammatory bowel disease Neg Hx      I have reviewed and agree with the history as documented.    E-Cigarette/Vaping    E-Cigarette Use Never User      E-Cigarette/Vaping Substances    Nicotine No     THC No     CBD No     Flavoring No     Other No     Unknown No      Social History     Tobacco Use    Smoking status: Some Days     Types: Cigarettes    Smokeless tobacco: Never   Vaping Use    Vaping status: Never Used   Substance Use Topics    Alcohol use: Not Currently    Drug use: Never       Review of Systems   Psychiatric/Behavioral:  Positive for hallucinations.        Physical Exam  Physical Exam  Vitals and nursing note reviewed.   Constitutional:       General: He is not in acute distress.     Appearance: He is well-developed.   HENT:      Head: Normocephalic and atraumatic.      Right Ear: External ear normal.      Left Ear: External ear normal.      Nose: Nose normal.   Eyes:      General: No scleral icterus.  Pulmonary:      Effort: Pulmonary effort is normal. No respiratory distress.   Abdominal:      General: There is no distension.      Palpations: Abdomen is soft.   Musculoskeletal:         General: No deformity. Normal range of motion.      Cervical back: Normal range of motion and neck supple.   Skin:     General: Skin is warm.      Findings: No rash.   Neurological:      General: No focal deficit present.      Mental Status: He is alert.      Gait: Gait normal.   Psychiatric:         Mood and Affect: Mood normal.         Behavior: Behavior is cooperative.      Comments: Calm and cooperative         Vital Signs  ED Triage Vitals [02/26/24 1118]   Temperature Pulse Respirations Blood Pressure SpO2   98 °F (36.7 °C) 99 18 170/93 93 %      Temp src Heart Rate Source Patient Position - Orthostatic VS BP Location FiO2 (%)    -- -- -- -- --      Pain Score       --           Vitals:    02/26/24 1118   BP: 170/93   Pulse: 99         Visual Acuity      ED Medications  Medications   QUEtiapine (SEROquel) tablet 100 mg (100 mg Oral Given 2/26/24 1313)       Diagnostic Studies  Results Reviewed       Procedure Component Value Units Date/Time    Rapid drug screen, urine [[id number]]  (Normal) Collected: 02/26/24 1142    Lab Status: Final result Specimen: Urine, Clean Catch Updated: 02/26/24 1204     Amph/Meth UR Negative     Barbiturate Ur Negative     Benzodiazepine Urine Negative     Cocaine Urine Negative     Methadone Urine Negative     Opiate Urine Negative     PCP Ur Negative     THC Urine Negative     Oxycodone Urine Negative    Narrative:      FOR MEDICAL PURPOSES ONLY.   IF CONFIRMATION NEEDED PLEASE CONTACT THE LAB WITHIN 5 DAYS.    Drug Screen Cutoff Levels:  AMPHETAMINE/METHAMPHETAMINES  1000 ng/mL  BARBITURATES     200 ng/mL  BENZODIAZEPINES     200 ng/mL  COCAINE      300 ng/mL  METHADONE      300 ng/mL  OPIATES      300 ng/mL  PHENCYCLIDINE     25 ng/mL  THC       50 ng/mL  OXYCODONE      100 ng/mL    POCT alcohol breath test [[id number]]  (Normal) Resulted: 02/26/24 1146    Lab Status: Final result Updated: 02/26/24 1146     EXTBreath Alcohol 0.000                   No orders to display              Procedures  Procedures         ED Course                               SBIRT 20yo+      Flowsheet Row Most Recent Value   Initial Alcohol Screen: US AUDIT-C     1. How often do you have a drink containing alcohol? 0 Filed at: 02/26/2024 1146   2. How many drinks containing alcohol do you have on a typical day you are drinking?  0 Filed at: 02/26/2024 1146   3a. Male UNDER 65: How often do you have five or more drinks on one occasion? 0 Filed at: 02/26/2024 1146   3b. FEMALE Any Age, or MALE 65+: How often do you have 4 or more drinks on one occassion? 0 Filed at: 02/26/2024 1146   Audit-C Score 0 Filed at: 02/26/2024 1146   RACHELL:  How many times in the past year have you...    Used an illegal drug or used a prescription medication for non-medical reasons? Never Filed at: 02/26/2024 1146                      Medical Decision Making  38-year-old male presenting with audio hallucinations.  Crisis and psychiatry consult.      Psychiatry consult completed.  Patient has not been taking full dose of Seroquel.  Will complete dose here in the emergency department and ACT team will follow-up to make sure patient is getting appropriate medication.    Amount and/or Complexity of Data Reviewed  Labs: ordered.    Risk  Prescription drug management.  Decision regarding hospitalization.             Disposition  Final diagnoses:   Hallucinations     Time reflects when diagnosis was documented in both MDM as applicable and the Disposition within this note       Time User Action Codes Description Comment    2/26/2024 12:07 PM Alex Fung Add [R44.3] Hallucinations           ED Disposition       ED Disposition   Discharge    Condition   Stable    Date/Time   Mon Feb 26, 2024 1306    Comment   Rush Briscoe discharge to home/self care.                   Follow-up Information       Follow up With Specialties Details Why Contact Info Additional Information    Nikki Mei MD Family Medicine   [address] [facility] [id number] [phone]        [facility] Emergency Department Emergency Medicine  If symptoms worsen [address] [facility] [address] [phone] [facility] Emergency Department, [address] [place], [place] [address]            Discharge Medication List as of 2/26/2024  1:06 PM        CONTINUE these medications which have NOT CHANGED    Details   ABILIFY MAINTENA 300 MG injection Starting Tue 3/12/2019, Historical Med      Albuterol Sulfate POWD Use, Historical Med      ALPRAZolam (XANAX) 1 mg tablet Starting Thu 3/7/2019, Historical Med       amphetamine-dextroamphetamine (ADDERALL) 30 MG tablet Starting Wed 3/20/2019, Historical Med      atorvastatin (LIPITOR) 80 mg tablet Starting Mon 2/25/2019, Historical Med      benztropine (COGENTIN) 1 mg tablet Starting Fri 1/11/2019, Historical Med      bismuth subsalicylate (PEPTO BISMOL) 262 MG chewable tablet Chew 1 tablet (262 mg total) 4 (four) times a day (with meals and at bedtime), Starting Tue 6/6/2023, Normal      busPIRone (BUSPAR) 30 MG tablet Starting Wed 2/6/2019, Historical Med      cloNIDine (CATAPRES) 0.1 mg tablet Starting Wed 4/26/2023, Historical Med      Deutetrabenazine 12 MG TABS Take by mouth, Historical Med      escitalopram (LEXAPRO) 5 mg tablet Take 10 mg by mouth daily, Starting Wed 4/26/2023, Historical Med      eszopiclone (LUNESTA) 2 mg tablet Starting Thu 3/7/2019, Historical Med      gabapentin (NEURONTIN) 400 mg capsule Starting Wed 8/16/2023, Historical Med      naloxone (NARCAN) 4 mg/0.1 mL nasal spray Historical Med      naltrexone (REVIA) 50 mg tablet Take 380 mg by mouth daily, Historical Med      NON-ASPIRIN PAIN RELIEF 325 MG tablet Starting Tue 1/8/2019, Historical Med      omeprazole (PriLOSEC OTC) 20 MG tablet Take 1 tablet (20 mg total) by mouth 2 (two) times a day before breakfast and lunch for 14 days, Starting Tue 6/6/2023, Until Tue 6/20/2023, Normal      omeprazole (PriLOSEC) 40 MG capsule Take 1 capsule (40 mg total) by mouth daily, Starting Thu 1/28/2021, Normal      polyethylene glycol (Golytely) 4000 mL solution Take 4,000 mL by mouth once for 1 dose Take 4000 mL by mouth once for 1 dose. Use as directed, Starting Wed 5/3/2023, Normal      polyethylene glycol (MiraLax) 17 GM/SCOOP powder Take 17 g by mouth daily Take 238 g my mouth. Use as directed, Starting Wed 5/3/2023, Normal      QUEtiapine (SEROquel) 50 mg tablet Starting Wed 4/26/2023, Historical Med      traZODone (DESYREL) 100 mg tablet Starting Thu 3/7/2019, Historical Med      triamcinolone  (KENALOG) 0.5 % cream Starting Tue 1/8/2019, Historical Med      !! venlafaxine (EFFEXOR-XR) 150 mg 24 hr capsule Starting Wed 3/20/2019, Historical Med      !! venlafaxine (EFFEXOR-XR) 75 mg 24 hr capsule Starting Thu 3/7/2019, Historical Med      zolpidem (AMBIEN) 10 mg tablet Starting Wed 4/26/2023, Historical Med       !! - Potential duplicate medications found. Please discuss with provider.          No discharge procedures on file.    PDMP Review       None            ED Provider  Electronically Signed by             Alex Fung DO  02/26/24 0921

## 2024-02-26 NOTE — DISCHARGE INSTRUCTIONS
This writer discussed the patients current presentation and recommended discharge plan with Dr. Torres They agree with the patient being discharged at this time.     The patient was Instructed to follow up with their ACT team      The patient declined to complete a safety plan however a blank plan was provided for future use.  In addition, the patient was instructed to call local [facility] crisis, other crisis services, 911 or to go to the nearest ER immediately if their situation changes at any time.     This writer discussed discharge plans with the patient who agrees with and understands the discharge plans.         SAFETY PLAN  Warning Signs (thoughts, images, mood, behavior, situations) of a potential crisis:       Coping Skills (what can I do to take my mind off the problem, or to keep myself safe):       Outside Support (who can I reach out to for support and help):         National Suicide Prevention Hotline:  [address] [place] [address] [phone] - [facility] [phone] - LVF Crisis/Mobile Crisis   [phone] - S[facility] Crisis   [facility]: [phone]  [facility]: [phone]   [facility] [phone] - Crisis   [facility] [phone] - Crisis     [facility] [phone] - Crisis   [facility] [phone] - Crisis   [phone] - Peer Support Talk Line (1-9pm daily)  [phone] - Teen Support Talk Line (1-9pm daily)  [phone] - [facility] [phone]- Crisis    [facility] [phone] - Crisis   [facility] [phone] - Crisis    [facility]) [phone] - [facility] [place] Crisis

## 2024-02-26 NOTE — ED NOTES
Pt presented to the ED with voices telling him to harm himself.  Pt reported that [facility] ACT is aware and Dr Gardiner did not make medication changes.  Pt reported that he lives independently.  Pt reported he feels safe and does not have access to firearms.  Pt is currently unemployed.  Pt reported no current MAT.  Pt denies drug and alcohol use.  Pt denies any acute medical issues.  Pt reported no legal issues.  Pt reported 1 cigarette a day.  Pt denies suicidal ideation and has never attempted.  Pt reported no self injurious history.  Pt denies homicidal ideation.  Pt reported no history of violence.  Pt reported no other symptoms of psychosis other than auditory hallucinations.  Attending provider notified of eval.

## 2025-07-20 PROBLEM — E78.6 LIPOPROTEIN DEFICIENCY: Status: ACTIVE | Noted: 2025-07-20

## 2025-07-20 PROBLEM — M25.561 PAIN IN RIGHT KNEE: Status: ACTIVE | Noted: 2025-07-20

## 2025-07-20 PROBLEM — G89.29 OTHER CHRONIC PAIN: Status: ACTIVE | Noted: 2025-07-20

## 2025-07-20 PROBLEM — F25.9 SCHIZOAFFECTIVE DISORDER, UNSPECIFIED (HCC): Status: ACTIVE | Noted: 2025-07-20

## 2025-07-20 PROBLEM — F41.9 ANXIETY DISORDER: Status: ACTIVE | Noted: 2025-07-20

## 2025-07-20 PROBLEM — E78.2 MIXED HYPERLIPIDEMIA: Status: ACTIVE | Noted: 2025-07-20

## 2025-07-20 PROBLEM — M25.562 PAIN IN LEFT KNEE: Status: ACTIVE | Noted: 2025-07-20

## 2025-07-20 PROBLEM — L40.9 PSORIASIS: Status: ACTIVE | Noted: 2025-07-20

## 2025-07-20 PROBLEM — G24.01: Status: ACTIVE | Noted: 2025-07-20

## 2025-07-20 PROBLEM — R73.01 IMPAIRED FASTING GLUCOSE: Status: ACTIVE | Noted: 2025-07-20

## 2025-07-20 PROBLEM — F90.9 ATTENTION DEFICIT HYPERACTIVITY DISORDER: Status: ACTIVE | Noted: 2025-07-20

## 2025-07-20 PROBLEM — F95.9 TIC DISORDER: Status: ACTIVE | Noted: 2025-07-20

## 2025-07-23 ENCOUNTER — OFFICE VISIT (OUTPATIENT)
Age: 40
End: 2025-07-23
Payer: COMMERCIAL

## 2025-07-23 VITALS
HEIGHT: 70 IN | SYSTOLIC BLOOD PRESSURE: 112 MMHG | BODY MASS INDEX: 42.16 KG/M2 | HEART RATE: 105 BPM | DIASTOLIC BLOOD PRESSURE: 80 MMHG | TEMPERATURE: 98.3 F | WEIGHT: 294.5 LBS | OXYGEN SATURATION: 98 %

## 2025-07-23 DIAGNOSIS — G47.33 OBSTRUCTIVE SLEEP APNEA: ICD-10-CM

## 2025-07-23 DIAGNOSIS — F25.1 SCHIZOAFFECTIVE DISORDER, DEPRESSIVE TYPE (HCC): ICD-10-CM

## 2025-07-23 DIAGNOSIS — E78.2 MIXED HYPERLIPIDEMIA: ICD-10-CM

## 2025-07-23 DIAGNOSIS — R73.01 IMPAIRED FASTING GLUCOSE: ICD-10-CM

## 2025-07-23 DIAGNOSIS — G89.29 CHRONIC PAIN OF LEFT KNEE: ICD-10-CM

## 2025-07-23 DIAGNOSIS — M25.562 CHRONIC PAIN OF LEFT KNEE: ICD-10-CM

## 2025-07-23 DIAGNOSIS — K76.0 FATTY LIVER: ICD-10-CM

## 2025-07-23 DIAGNOSIS — G89.29 CHRONIC LEFT SHOULDER PAIN: ICD-10-CM

## 2025-07-23 DIAGNOSIS — Z00.00 MEDICARE ANNUAL WELLNESS VISIT, SUBSEQUENT: Primary | ICD-10-CM

## 2025-07-23 DIAGNOSIS — E66.01 MORBID OBESITY WITH BMI OF 40.0-44.9, ADULT (HCC): ICD-10-CM

## 2025-07-23 DIAGNOSIS — M25.512 CHRONIC LEFT SHOULDER PAIN: ICD-10-CM

## 2025-07-23 PROBLEM — R10.13 EPIGASTRIC ABDOMINAL PAIN: Status: RESOLVED | Noted: 2019-03-21 | Resolved: 2025-07-23

## 2025-07-23 PROBLEM — G24.01: Status: RESOLVED | Noted: 2025-07-20 | Resolved: 2025-07-23

## 2025-07-23 PROBLEM — A04.9 BACTERIAL COLITIS: Status: RESOLVED | Noted: 2019-03-21 | Resolved: 2025-07-23

## 2025-07-23 PROCEDURE — G0439 PPPS, SUBSEQ VISIT: HCPCS | Performed by: FAMILY MEDICINE

## 2025-07-23 PROCEDURE — 99213 OFFICE O/P EST LOW 20 MIN: CPT | Performed by: FAMILY MEDICINE

## 2025-07-23 PROCEDURE — G2211 COMPLEX E/M VISIT ADD ON: HCPCS | Performed by: FAMILY MEDICINE

## 2025-07-23 RX ORDER — CHLORHEXIDINE GLUCONATE ORAL RINSE 1.2 MG/ML
SOLUTION DENTAL
COMMUNITY
Start: 2025-04-22

## 2025-07-23 RX ORDER — ATOMOXETINE 60 MG/1
CAPSULE ORAL
COMMUNITY
Start: 2025-06-27

## 2025-07-23 RX ORDER — DOXEPIN HYDROCHLORIDE 25 MG/1
CAPSULE ORAL
COMMUNITY
Start: 2025-06-26

## 2025-07-23 RX ORDER — ATOMOXETINE 40 MG/1
CAPSULE ORAL
COMMUNITY
Start: 2025-05-08

## 2025-07-23 NOTE — PROGRESS NOTES
[facility]- Outpatient Rehabilitation and Therapy [address], [address], [place] [address] office: [phone] fax: [phone]      Physical Therapy: TREATMENT/PROGRESS NOTE   Patient: Tiera Hawkins (55 y.o. female)   Treatment Date: 2024   :  1968 MRN: [id number]   Visit #: 12   Insurance Allowable Auth Needed   [place] []Yes    [x]No    Insurance: Payor: [employer] / Plan: [employer] - OPTIONS / Product Type: *No Product type* /   Insurance ID: [id number] - (Commercial)  Secondary Insurance (if applicable):    Treatment Diagnosis:      Medical Diagnosis:    Lymphedema, not elsewhere classified [I89.0]   Referring Physician: Sherif Gant MD  PCP: Miladis Read PA                             Plan of care signed: NO    Date of Patient follow up with Physician:      Progress Report/POC: NO;  2024 (should have been 2024 instead of 2024; last note was 2024)  POC update due: (10 visits /OR AUTH LIMITS, whichever is less)  2024    Precautions/ Contra-indications:   Latex allergy:   No sensitivity to adhesive  Pacemaker:     No  Other:                    N/A  Surgical Date:       3/29/2024 lipo-suction;    May 9th surgery next hip/buttock  Red Flags:  None     C-SSRS Triggered by Intake questionnaire:   [x] No, Questionnaire did not trigger screening.   [] Yes, Patient intake triggered further evaluation      [] C-SSRS Screening completed  [] PCP notified via Plan of Care  [] Emergency services notified          Preferred Language for Healthcare:   [x]English       []other:    SUBJECTIVE EXAMINATION     Patient Report/Comments: Pt reports that she had less tenderness on low back.        Test used Initial score  2024   Pain Summary VAS 4/10 1/10  superficial SI region     Functional questionnaire LLIS =33%     Other:                OBJECTIVE EXAMINATION     Observation:   *O2 Sat 98%, pulse 78, /81    Test measurements:  Name: Rush Briscoe      : 1985      MRN: [id number]  Encounter Provider: Cristiane Oneil MD  Encounter Date: 2025   Encounter department: [facility]  :  Assessment & Plan  Medicare annual wellness visit, subsequent  Medicare questionnaire reviewed. Cont to see specialist as advised. Immunizations UTD.        Fatty liver  Check labs, wt loss, low-fat/chol diet. Cont statin  Orders:    Comprehensive metabolic panel    Impaired fasting glucose  Check labs, wt loss, low-carb/sweet diet  Orders:    Hemoglobin A1C    Mixed hyperlipidemia  Is followed by cardiology. Cont statin, wt loss, low-fat/chol diet  Orders:    TSH, 3rd generation with Free T4 reflex    Schizoaffective disorder, depressive type (HCC)  Cont to f/u with psych         Morbid obesity with BMI of 40.0-44.9, adult (HCC)  Advised wt loss, incr CV exercise, healthy eating         Chronic pain of left knee  Pt didn't want to go to PT. Gave written info incl home PT. Didn't advise muscle relaxer as could interfere with several of his other meds. OTC meds prn advised       Chronic left shoulder pain  As above with L knee plan       Obstructive sleep apnea  Cont CPAP compliance          Preventive health issues were discussed with patient, and age appropriate screening tests were ordered as noted in patient's After Visit Summary. Personalized health advice and appropriate referrals for health education or preventive services given if needed, as noted in patient's After Visit Summary.    History of Present Illness     Pt presents today with his father for a physical.  He is c/o L shoulder pain and stiffness as well as L knee pain for several months. Denies trauma. Asking for a muscle relaxer to help with pain. Doesn't want to go to PT. No other questions/concerns. He continues to see psychiatry. States mood overall doing well. Saw his cardiologist in May. No changes made. Wt is up 26lbs from Nov. Is trying to eat  healthier and be more active. He has full dental implants.  He has ALEJANDRA and is compliant with CPAP. Last Tdap 2/10/18.            Patient Care Team:  Crystal Reyes MD as PCP - [facility]CP-[facility] ([facility])  Jose Francisco Oneil MD as PCP - PCP-[facility] ([facility])    Review of Systems   Constitutional:  Negative for activity change, appetite change and fatigue.        As per HPI    HENT:  Negative for congestion, dental problem, ear discharge, ear pain, hearing loss, postnasal drip and sinus pressure.    Eyes:  Negative for pain, discharge, redness and itching.   Respiratory:  Negative for cough, chest tightness, shortness of breath and wheezing.    Cardiovascular:  Negative for chest pain, palpitations and leg swelling.        As per HPI    Gastrointestinal:  Negative for abdominal pain, constipation, diarrhea, nausea and vomiting.   Musculoskeletal:  Positive for arthralgias and myalgias. Negative for back pain, joint swelling and neck pain.   Skin:  Negative for rash.   Neurological:  Negative for dizziness and headaches.   Psychiatric/Behavioral:          As per HPI      Medical History Reviewed by provider this encounter:  Tobacco  Allergies  Meds  Problems  Med Hx  Surg Hx  Fam Hx       Annual Wellness Visit Questionnaire       Health Risk Assessment:   Patient rates overall health as fair. Patients states they are sometimes angry. Patient states they are sometimes unusually tired/fatigued.     Depression Screening:   PHQ-2 Score: 0      Preventive Screenings      Cardiovascular Screening:    General: Screening Not Indicated and History Lipid Disorder      Prostate Cancer Screening:    General: Screening Not Indicated      Lung Cancer Screening:     General: Screening Not Indicated      Hepatitis C Screening:    General: Screening Current    Immunizations:  - Immunizations due: Prevnar 20    Social Drivers of Health     Transportation Needs: No Transportation Needs (7/23/2025)    PRAPARE - Transportation      "Lack of Transportation (Medical): No     Lack of Transportation (Non-Medical): No   Housing Stability: Unknown (7/23/2025)    Housing Stability Vital Sign     Unable to Pay for Housing in the Last Year: No     Homeless in the Last Year: No   Utilities: Not At Risk (7/23/2025)    [facility] Utilities     Threatened with loss of utilities: No     No results found.    Objective   /80   Pulse 105   Temp 98.3 °F (36.8 °C) (Tympanic)   Ht 5' 10\" (1.778 m)   Wt 134 kg (294 lb 8 oz)   SpO2 98%   BMI 42.26 kg/m²     Physical Exam  Vitals and nursing note reviewed.   Constitutional:       General: He is not in acute distress.     Appearance: Normal appearance.   HENT:      Head: Normocephalic and atraumatic.      Right Ear: Tympanic membrane, ear canal and external ear normal.      Left Ear: Tympanic membrane, ear canal and external ear normal.      Nose: Nose normal.      Mouth/Throat:      Mouth: Mucous membranes are moist.      Pharynx: Oropharynx is clear.     Eyes:      Conjunctiva/sclera: Conjunctivae normal.       Cardiovascular:      Rate and Rhythm: Normal rate and regular rhythm.      Pulses:           Dorsalis pedis pulses are 2+ on the right side and 2+ on the left side.        Posterior tibial pulses are 2+ on the right side and 2+ on the left side.      Heart sounds: Normal heart sounds. No murmur heard.     No gallop.   Pulmonary:      Effort: Pulmonary effort is normal.      Breath sounds: Normal breath sounds. No wheezing, rhonchi or rales.   Abdominal:      General: There is no distension.      Palpations: Abdomen is soft.      Tenderness: There is no abdominal tenderness. There is no guarding or rebound.      Hernia: No hernia is present.     Musculoskeletal:         General: No swelling or deformity. Normal range of motion.      Cervical back: Normal range of motion and neck supple. No tenderness.      Right lower leg: No edema.      Left lower leg: No edema.      Comments: Pt point over L patellar " tendon as area of pain. No swelling, FROM.   L shoulder: FROM, pain with empty can test, pain with Neer's, neg Hawkin's, no pain with ext or internal rotation.    Feet:      Right foot:      Skin integrity: No ulcer, skin breakdown, erythema, warmth, callus or dry skin.      Left foot:      Skin integrity: No ulcer, skin breakdown, erythema, warmth, callus or dry skin.     Skin:     General: Skin is warm.      Findings: No rash.     Neurological:      General: No focal deficit present.      Mental Status: He is alert and oriented to person, place, and time. Mental status is at baseline.      Gait: Gait normal.     Psychiatric:         Mood and Affect: Mood normal.         Behavior: Behavior normal.         Thought Content: Thought content normal.

## 2025-07-23 NOTE — ASSESSMENT & PLAN NOTE
Is followed by cardiology. Cont statin, wt loss, low-fat/chol diet  Orders:    TSH, 3rd generation with Free T4 reflex

## 2025-07-24 PROBLEM — I45.10 RBBB (RIGHT BUNDLE BRANCH BLOCK): Status: ACTIVE | Noted: 2025-07-24

## 2025-07-24 NOTE — ASSESSMENT & PLAN NOTE
Pt didn't want to go to PT. Gave written info incl home PT. Didn't advise muscle relaxer as could interfere with several of his other meds. OTC meds prn advised

## 2025-07-31 LAB
ALBUMIN SERPL-MCNC: 4.4 G/DL (ref 4.1–5.1)
ALP SERPL-CCNC: 62 IU/L (ref 44–121)
ALT SERPL-CCNC: 38 IU/L (ref 0–44)
AST SERPL-CCNC: 28 IU/L (ref 0–40)
BILIRUB SERPL-MCNC: <0.2 MG/DL (ref 0–1.2)
BUN SERPL-MCNC: 8 MG/DL (ref 6–24)
BUN/CREAT SERPL: 9 (ref 9–20)
CALCIUM SERPL-MCNC: 9.5 MG/DL (ref 8.7–10.2)
CHLORIDE SERPL-SCNC: 103 MMOL/L (ref 96–106)
CO2 SERPL-SCNC: 23 MMOL/L (ref 20–29)
CREAT SERPL-MCNC: 0.85 MG/DL (ref 0.76–1.27)
EGFR: 111 ML/MIN/1.73
EST. AVERAGE GLUCOSE BLD GHB EST-MCNC: 108 MG/DL
GLOBULIN SER-MCNC: 2.6 G/DL (ref 1.5–4.5)
GLUCOSE SERPL-MCNC: 68 MG/DL (ref 70–99)
HBA1C MFR BLD: 5.4 % (ref 4.8–5.6)
POTASSIUM SERPL-SCNC: 4.6 MMOL/L (ref 3.5–5.2)
PROT SERPL-MCNC: 7 G/DL (ref 6–8.5)
SODIUM SERPL-SCNC: 141 MMOL/L (ref 134–144)
TSH SERPL DL<=0.005 MIU/L-ACNC: 2.53 UIU/ML (ref 0.45–4.5)